# Patient Record
Sex: MALE | Race: WHITE | NOT HISPANIC OR LATINO | Employment: FULL TIME | URBAN - NONMETROPOLITAN AREA
[De-identification: names, ages, dates, MRNs, and addresses within clinical notes are randomized per-mention and may not be internally consistent; named-entity substitution may affect disease eponyms.]

---

## 2017-02-01 RX ORDER — OSELTAMIVIR PHOSPHATE 75 MG/1
75 CAPSULE ORAL 2 TIMES DAILY
Qty: 10 CAPSULE | Refills: 0 | Status: SHIPPED | OUTPATIENT
Start: 2017-02-01 | End: 2017-07-12

## 2017-02-09 ENCOUNTER — OFFICE VISIT (OUTPATIENT)
Dept: ORTHOPEDIC SURGERY | Facility: CLINIC | Age: 57
End: 2017-02-09

## 2017-02-09 VITALS — HEIGHT: 67 IN | BODY MASS INDEX: 32.65 KG/M2 | WEIGHT: 208 LBS

## 2017-02-09 DIAGNOSIS — S43.431A SLAP LESION OF RIGHT SHOULDER: ICD-10-CM

## 2017-02-09 DIAGNOSIS — M25.511 ACUTE PAIN OF RIGHT SHOULDER: Primary | ICD-10-CM

## 2017-02-09 DIAGNOSIS — M75.41 IMPINGEMENT SYNDROME OF RIGHT SHOULDER: ICD-10-CM

## 2017-02-09 DIAGNOSIS — I25.10 CAD IN NATIVE ARTERY: ICD-10-CM

## 2017-02-09 PROCEDURE — 20610 DRAIN/INJ JOINT/BURSA W/O US: CPT | Performed by: ORTHOPAEDIC SURGERY

## 2017-02-09 PROCEDURE — 99203 OFFICE O/P NEW LOW 30 MIN: CPT | Performed by: ORTHOPAEDIC SURGERY

## 2017-02-09 RX ADMIN — TRIAMCINOLONE ACETONIDE 40 MG: 40 INJECTION, SUSPENSION INTRA-ARTICULAR; INTRAMUSCULAR at 10:21

## 2017-02-09 RX ADMIN — LIDOCAINE HYDROCHLORIDE 2 ML: 10 INJECTION, SOLUTION INFILTRATION; PERINEURAL at 10:21

## 2017-02-09 NOTE — PROGRESS NOTES
Prudencio Rodriguez is a 56 y.o. male   Primary provider:  Amandeep Petersen MD       Chief Complaint   Patient presents with   • Right Shoulder - [facility]     Patient injuried his shoulder on 10/2016 while lifting a log to put a small piece under it to cut. Something popped in the shoulder and lost use for a small time do to pain       HISTORY OF PRESENT ILLNESS: RIGHT SHOULDER PAIN- HIS PAIN GETS WORSE AS THE DAY GOES ON AND HE IS ALSO HAVING PAIN ON THE RIGHT SIDE OF HIS NECK    Shoulder Injury    The incident occurred at home. The right shoulder is affected. Incident onset: 4 months ago. Injury mechanism: lifting a heavy object. Quality: sharp. The pain is moderate. Associated symptoms include numbness and tingling. The symptoms are aggravated by overhead lifting and movement. Treatments tried: steroid shot. The treatment provided mild relief.   Had an injection in early January without any changes.  No PT, No NSAIDS.  Worse pain in the evenings.  Overhead activity is worse.Occasionally wakes him up.  No numbness or tingling.     CONCURRENT MEDICAL HISTORY:    Past Medical History   Diagnosis Date   • Acute laryngitis    • Acute sinusitis    • Allergic rhinitis    • Asthma    • Backache    • Blood chemistry abnormality    • Chest pain    • Disorder      Disorder of cervical spine      • Disorder      Disorder of lumbar disc      • Dyspnea    • Encounter for general health examination      Individual health examination      • Encounter for screening for malignant neoplasm of prostate    • Encounter for screening for malignant neoplasm of prostate    • Fatigue    • Heart disease    • Hyperglycemia    • Knee pain      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      • Malaise and fatigue    • Pain in lower limb    • Tachycardia    • Vitamin B12 deficiency (non anemic)        No Known Allergies      Current Outpatient Prescriptions:   •  albuterol (PROVENTIL HFA;VENTOLIN HFA) 108  (90 BASE) MCG/ACT inhaler, Inhale 2 puffs Every 6 (Six) Hours As Needed for wheezing., Disp: 1 inhaler, Rfl: 5  •  clopidogrel (PLAVIX) 75 MG tablet, Take 1 tablet by mouth daily., Disp: 30 tablet, Rfl: 5  •  gabapentin (NEURONTIN) 300 MG capsule, Take 1 capsule by mouth Daily., Disp: 30 capsule, Rfl: 5  •  mometasone-formoterol (DULERA 200) 200-5 MCG/ACT inhaler, Inhale 2 puffs 2 (Two) Times a Day., Disp: 13 g, Rfl: 5  •  montelukast (SINGULAIR) 10 MG tablet, TAKE 1 TABLET BY MOUTH DAILY, Disp: 30 tablet, Rfl: 5  •  oseltamivir (TAMIFLU) 75 MG capsule, Take 1 capsule by mouth 2 (Two) Times a Day., Disp: 10 capsule, Rfl: 0  •  pantoprazole (PROTONIX) 40 MG EC tablet, Take 1 tablet by mouth Daily., Disp: 30 tablet, Rfl: 2  •  pravastatin (PRAVACHOL) 20 MG tablet, TAKE 1 TABLET BY MOUTH DAILY AT BEDTIME, Disp: 30 tablet, Rfl: 6    Past Surgical History   Procedure Laterality Date   • Knee arthroscopy  11/05/1987     Anesth, knee arthroscopy (1)      • Injection of medication  08/08/2014     B12 (1)     • Cardiac catheterization  07/13/2016     Cardiac cath 97492 (1)      • Injection of medication  01/20/2015     Depo Medrol (Methylprednisone) 80mg (3)    • Injection of medication  10/19/2015     Dexamethasone (1)      • Coronary stent placement     • Appendectomy  1970   • Sinus surgery  1995       Family History   Problem Relation Age of Onset   • Hypertension Other    • Thyroid disease Other      Thyroid Disorder   • Cancer Mother    • Diabetes Mother    • Hypertension Mother    • Diabetes insipidus Mother    • Heart disease Father    • Alzheimer's disease Father         Social History     Social History   • Marital status:      Spouse name: N/A   • Number of children: N/A   • Years of education: N/A     Occupational History   • Not on file.     Social History Main Topics   • Smoking status: Never Smoker   • Smokeless tobacco: Never Used   • Alcohol use No   • Drug use: No   • Sexual activity: Not on file  "    Other Topics Concern   • Not on file     Social History Narrative        Review of Systems   Respiratory: Negative.    Cardiovascular: Negative.    Neurological: Positive for tingling and numbness.   All other systems reviewed and are negative.      PHYSICAL EXAMINATION:       Visit Vitals   • Ht 67\" (170.2 cm)   • Wt 208 lb (94.3 kg)   • BMI 32.58 kg/m2       Physical Exam   Constitutional: He is oriented to person, place, and time. He appears well-developed and well-nourished.   Neurological: He is alert and oriented to person, place, and time.   Psychiatric: He has a normal mood and affect. His behavior is normal. Judgment and thought content normal.       GAIT:     [x]  Normal  []  Antalgic    Assistive device: [x]  None  []  Walker     []  Crutches  []  Cane     []  Wheelchair  []  Stretcher    Right Shoulder Exam     Range of Motion   Active Abduction: 130 (with pain.  150 passively with pain)   Forward Flexion: 160   External Rotation: 70     Muscle Strength   Abduction: 4/5   Subscapularis: 4/5     Tests   Cross Arm: positive  Hawkin's test: positive  Impingement: positive    Other   Erythema: absent  Sensation: normal  Pulse: present    Comments:  Stable exam.      Left Shoulder Exam     Tenderness   The patient is experiencing no tenderness.         Range of Motion   The patient has normal left shoulder ROM.    Muscle Strength   The patient has normal left shoulder strength.    Tests   Cross Arm: negative  Hawkin's test: negative  Impingement: negative    Other   Sensation: normal  Pulse: present     Comments:  Stable exam.              Xr Shoulder 2+ View Right    Result Date: 2/9/2017  Narrative: 3 views of the right shoulder show acceptable position and alignment with no evidence of acute bony abnormality.  Moderate acromioclavicular joint arthritic change with subclavicular spurring.  Type II acromion is noted. No comparison views available. 02/09/17 at 5:16 PM by Stephane Prieto MD "         MRI RIGHT SHOULDER DONE AT [facility] IN [place]  IMPRESSION:  1. SUPERIOR GLENOID LABRAL TEAR. ABNORMAL INTRASUBSTANCE SIGNAL APPROACHING FLUID INTENSITY IN THE ANTERIOR SUPERIOR, SUPERIOR AND POSTERIOR SUPERIOR QUADRANTS.    2. NO ROTATOR CUFF TEAR.  SEVERAL FINDINGS CAN BE ASSOCIATED WITH ROTATOR CUFF IMPINGEMENT; MILD TO MODERATE SUPRASPINATUS TENDINOSIS.  MILD INFRASPINATUS AND SUBSCAPULARIS TENDINOSIS.  MILD AC JOINT ARTHROSIS. SUBCORTICAL CYSTS IN THE POSTEROLATERAL HUMERAL HEAD.    3. OBLONG FLUID SIGNAL STRUCTURE IN THE MEDIAL INFERIOR SUBSCAPULARIS MUSCLE BELLY MEASURING 4.0 X 2.0 X 1.3 CM.  DIFFERENTIAL DIAGNOSIS INCLUDES GANGLION CYST ORIGINATING FROM THE GLENOHUMERAL JOINT, VERSUS SENTINEL CYST      ASSESSMENT:    Diagnoses and all orders for this visit:    Acute pain of right shoulder  -     XR Shoulder 2+ View Right  -     Large Joint Arthrocentesis  -     Ambulatory Referral to Physical Therapy Evaluate and treat (impingement and SLAP tear, 2-4 visits for ROM/STR, HEP. may need shoulder scope.)    CAD in native artery    SLAP lesion of right shoulder    Impingement syndrome of right shoulder      Large Joint Arthrocentesis  Date/Time: 2/9/2017 10:21 AM  Consent given by: patient  Site marked: site marked  Timeout: Immediately prior to procedure a time out was called to verify the correct patient, procedure, equipment, support staff and site/side marked as required   Supporting Documentation  Indications: pain   Procedure Details  Location: shoulder - R subacromial bursa  Preparation: Patient was prepped and draped in the usual sterile fashion  Needle size: 22 G  Approach: posterior  Medications administered: 40 mg triamcinolone acetonide 40 MG/ML; 2 mL lidocaine 1 %  Patient tolerance: patient tolerated the procedure well with no immediate complications          PLAN    Stent placed in LAD 8/2016  Plan repeat injection today   ROM/STR  PT for exercises and HEP  -progress motion as tolerated.  Discussed possible surgery if not improving.    Return in about 6 weeks (around 3/23/2017).    Stephane Prieto MD

## 2017-02-12 RX ORDER — LIDOCAINE HYDROCHLORIDE 10 MG/ML
2 INJECTION, SOLUTION INFILTRATION; PERINEURAL
Status: COMPLETED | OUTPATIENT
Start: 2017-02-09 | End: 2017-02-09

## 2017-02-12 RX ORDER — TRIAMCINOLONE ACETONIDE 40 MG/ML
40 INJECTION, SUSPENSION INTRA-ARTICULAR; INTRAMUSCULAR
Status: COMPLETED | OUTPATIENT
Start: 2017-02-09 | End: 2017-02-09

## 2017-03-08 ENCOUNTER — OFFICE VISIT (OUTPATIENT)
Dept: ORTHOPEDIC SURGERY | Facility: CLINIC | Age: 57
End: 2017-03-08

## 2017-03-08 VITALS — HEIGHT: 67 IN | BODY MASS INDEX: 32.96 KG/M2 | WEIGHT: 210 LBS

## 2017-03-08 DIAGNOSIS — M75.41 IMPINGEMENT SYNDROME OF RIGHT SHOULDER: ICD-10-CM

## 2017-03-08 DIAGNOSIS — M25.511 ACUTE PAIN OF RIGHT SHOULDER: Primary | ICD-10-CM

## 2017-03-08 DIAGNOSIS — S43.431A SLAP LESION OF RIGHT SHOULDER: ICD-10-CM

## 2017-03-08 PROCEDURE — 99213 OFFICE O/P EST LOW 20 MIN: CPT | Performed by: ORTHOPAEDIC SURGERY

## 2017-03-08 NOTE — PROGRESS NOTES
Prudencio Rodriguez is a 56 y.o. male returns for     Chief Complaint   Patient presents with   • Right Shoulder - Follow-up       HISTORY OF PRESENT ILLNESS: steroid injection done on 2/9/2017, has helped. Physical therapy at [facility] patient completed 4 visits is now doing HEP on a daily basis.        CONCURRENT MEDICAL HISTORY:    Past Medical History   Diagnosis Date   • Acute laryngitis    • Acute sinusitis    • Allergic rhinitis    • Asthma    • Backache    • Blood chemistry abnormality    • Chest pain    • Disorder      Disorder of cervical spine      • Disorder      Disorder of lumbar disc      • Dyspnea    • Encounter for general health examination      Individual health examination      • Encounter for screening for malignant neoplasm of prostate    • Encounter for screening for malignant neoplasm of prostate    • Fatigue    • Heart disease    • Hyperglycemia    • Knee pain      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      • Malaise and fatigue    • Pain in lower limb    • Tachycardia    • Vitamin B12 deficiency (non anemic)        No Known Allergies      Current Outpatient Prescriptions:   •  albuterol (PROVENTIL HFA;VENTOLIN HFA) 108 (90 BASE) MCG/ACT inhaler, Inhale 2 puffs Every 6 (Six) Hours As Needed for wheezing., Disp: 1 inhaler, Rfl: 5  •  clopidogrel (PLAVIX) 75 MG tablet, Take 1 tablet by mouth daily., Disp: 30 tablet, Rfl: 5  •  gabapentin (NEURONTIN) 300 MG capsule, Take 1 capsule by mouth Daily., Disp: 30 capsule, Rfl: 5  •  mometasone-formoterol (DULERA 200) 200-5 MCG/ACT inhaler, Inhale 2 puffs 2 (Two) Times a Day., Disp: 13 g, Rfl: 5  •  montelukast (SINGULAIR) 10 MG tablet, TAKE 1 TABLET BY MOUTH DAILY, Disp: 30 tablet, Rfl: 5  •  pantoprazole (PROTONIX) 40 MG EC tablet, Take 1 tablet by mouth Daily., Disp: 30 tablet, Rfl: 2  •  pravastatin (PRAVACHOL) 20 MG tablet, TAKE 1 TABLET BY MOUTH DAILY AT BEDTIME, Disp: 30 tablet, Rfl: 6  •  oseltamivir  "(TAMIFLU) 75 MG capsule, Take 1 capsule by mouth 2 (Two) Times a Day., Disp: 10 capsule, Rfl: 0    Past Surgical History   Procedure Laterality Date   • Knee arthroscopy  11/05/1987     Anesth, knee arthroscopy (1)      • Injection of medication  08/08/2014     B12 (1)     • Cardiac catheterization  07/13/2016     Cardiac cath 70823 (1)      • Injection of medication  01/20/2015     Depo Medrol (Methylprednisone) 80mg (3)    • Injection of medication  10/19/2015     Dexamethasone (1)      • Coronary stent placement     • Appendectomy  1970   • Sinus surgery  1995       ROS  No fevers or chills.  No chest pain or shortness of air.  No GI or  disturbances.    PHYSICAL EXAMINATION:       Visit Vitals   • Ht 67\" (170.2 cm)   • Wt 210 lb (95.3 kg)   • BMI 32.89 kg/m2       Physical Exam   Constitutional: He is oriented to person, place, and time. He appears well-developed and well-nourished.   Neurological: He is alert and oriented to person, place, and time.   Psychiatric: He has a normal mood and affect. His behavior is normal. Judgment and thought content normal.       GAIT:     [x]  Normal  []  Antalgic    Assistive device: [x]  None  []  Walker     []  Crutches  []  Cane     []  Wheelchair  []  Stretcher    Right Shoulder Exam     Range of Motion   Active Abduction: 160 (with pain.  150 passively with pain)   Forward Flexion: 160   External Rotation: 70     Muscle Strength   Abduction: 4/5   Subscapularis: 4/5     Tests   Cross Arm: negative  Hawkin's test: positive  Impingement: negative    Other   Erythema: absent  Sensation: normal  Pulse: present    Comments:  Stable exam.              Xr Shoulder 2+ View Right    Result Date: 2/9/2017  Narrative: 3 views of the right shoulder show acceptable position and alignment with no evidence of acute bony abnormality.  Moderate acromioclavicular joint arthritic change with subclavicular spurring.  Type II acromion is noted. No comparison views available. 02/09/17 at " 5:16 PM by Stephane Prieto MD     MRI RIGHT SHOULDER DONE AT [facility] IN [place]  IMPRESSION:  1. SUPERIOR GLENOID LABRAL TEAR. ABNORMAL INTRASUBSTANCE SIGNAL APPROACHING FLUID INTENSITY IN THE ANTERIOR SUPERIOR, SUPERIOR AND POSTERIOR SUPERIOR QUADRANTS.     2. NO ROTATOR CUFF TEAR. SEVERAL FINDINGS CAN BE ASSOCIATED WITH ROTATOR CUFF IMPINGEMENT; MILD TO MODERATE SUPRASPINATUS TENDINOSIS. MILD INFRASPINATUS AND SUBSCAPULARIS TENDINOSIS. MILD AC JOINT ARTHROSIS. SUBCORTICAL CYSTS IN THE POSTEROLATERAL HUMERAL HEAD.     3. OBLONG FLUID SIGNAL STRUCTURE IN THE MEDIAL INFERIOR SUBSCAPULARIS MUSCLE BELLY MEASURING 4.0 X 2.0 X 1.3 CM. DIFFERENTIAL DIAGNOSIS INCLUDES GANGLION CYST ORIGINATING FROM THE GLENOHUMERAL JOINT, VERSUS SENTINEL CYST        ASSESSMENT:    Diagnoses and all orders for this visit:    Acute pain of right shoulder    SLAP lesion of right shoulder    Impingement syndrome of right shoulder          PLAN    Pain is improving.  Motion improved.  Continue HEP.  Activity as tolerated.  Discussed possible repeat injection if symptoms worsen again.    Return if symptoms worsen or fail to improve.    Stephane Prieto MD

## 2017-03-29 RX ORDER — CLOPIDOGREL BISULFATE 75 MG/1
TABLET ORAL
Qty: 30 TABLET | Refills: 5 | Status: SHIPPED | OUTPATIENT
Start: 2017-03-29 | End: 2017-09-19 | Stop reason: SDUPTHER

## 2017-04-13 ENCOUNTER — OFFICE VISIT (OUTPATIENT)
Dept: CARDIOLOGY | Facility: CLINIC | Age: 57
End: 2017-04-13

## 2017-04-13 VITALS
OXYGEN SATURATION: 95 % | HEIGHT: 67 IN | WEIGHT: 208 LBS | HEART RATE: 84 BPM | DIASTOLIC BLOOD PRESSURE: 68 MMHG | SYSTOLIC BLOOD PRESSURE: 110 MMHG | BODY MASS INDEX: 32.65 KG/M2

## 2017-04-13 DIAGNOSIS — E78.5 HYPERLIPIDEMIA, UNSPECIFIED HYPERLIPIDEMIA TYPE: ICD-10-CM

## 2017-04-13 DIAGNOSIS — I25.10 CORONARY ARTERY DISEASE INVOLVING NATIVE CORONARY ARTERY OF NATIVE HEART WITHOUT ANGINA PECTORIS: Primary | ICD-10-CM

## 2017-04-13 PROCEDURE — 93010 ELECTROCARDIOGRAM REPORT: CPT | Performed by: INTERNAL MEDICINE

## 2017-04-13 PROCEDURE — 99213 OFFICE O/P EST LOW 20 MIN: CPT | Performed by: INTERNAL MEDICINE

## 2017-04-13 RX ORDER — ASPIRIN 81 MG/1
81 TABLET, CHEWABLE ORAL DAILY
COMMUNITY
End: 2017-07-12

## 2017-04-13 NOTE — PROGRESS NOTES
Chief complaint : Coronary artery disease    History of Present Illness this is a very pleasant 56-year-old gentleman who comes today for follow-up visit.  Patient underwent successful PCI with a stent placement to the LAD August 16, 2016.  Patient is tolerating the guideline direct medical therapy.  He was currently switched to aspirin 81 mg which has helped significantly with his dyspepsia.  Patient stated that he is feeling much better after his stent placement.    Subjective      Review of Systems   Constitution: Negative. Negative for decreased appetite, diaphoresis, weakness, night sweats, weight gain and weight loss.   HENT: Negative for headaches, hearing loss, nosebleeds and sore throat.    Eyes: Negative.  Negative for blurred vision and photophobia.   Cardiovascular: Negative for chest pain, claudication, dyspnea on exertion, irregular heartbeat, leg swelling, palpitations, paroxysmal nocturnal dyspnea and syncope.   Respiratory: Negative for cough, hemoptysis, shortness of breath and wheezing.    Endocrine: Negative for cold intolerance, heat intolerance, polydipsia, polyphagia and polyuria.   Hematologic/Lymphatic: Negative.    Skin: Negative for color change, dry skin, flushing, itching and rash.   Musculoskeletal: Negative.  Negative for muscle cramps, muscle weakness and myalgias.   Gastrointestinal: Negative for abdominal pain, change in bowel habit, diarrhea, hematemesis, melena, nausea and vomiting.   Genitourinary: Negative for dysuria, frequency and hematuria.   Neurological: Negative for dizziness, focal weakness, light-headedness, loss of balance, numbness and seizures.   Psychiatric/Behavioral: Negative.  Negative for substance abuse, suicidal ideas and thoughts of violence.   Allergic/Immunologic: Negative.        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     reports that he has never smoked. He has never used smokeless tobacco. He reports that he does not drink alcohol or use illicit drugs.    Objective     Vital Signs  Heart Rate:  [84] 84  BP: (110)/(68) 110/68    Physical Exam   Constitutional: He is oriented to person, place, and time. He appears well-developed and well-nourished.   HENT:   Head: Normocephalic and atraumatic.   Eyes: Conjunctivae and EOM are normal. Pupils are equal, round, and reactive to light.   Neck: Neck supple. No JVD present. Carotid bruit is not present. No tracheal deviation and no edema present.   Cardiovascular: Normal rate, regular rhythm, S1 normal, S2 normal, normal heart sounds and intact distal pulses.  Exam reveals no gallop, no S3, no S4 and no friction rub.    No murmur heard.  Pulmonary/Chest: Effort normal and breath sounds normal. He has no wheezes. He has no rales. He exhibits no tenderness.   Abdominal: Bowel sounds are normal. He exhibits no abdominal bruit and no pulsatile midline mass. There is no rebound and no guarding.    Musculoskeletal: Normal range of motion. He exhibits no edema.   Neurological: He is alert and oriented to person, place, and time.   Skin: Skin is warm and dry.   Psychiatric: He has a normal mood and affect.       SCANNED EKG  Date/Time: 4/13/2017 5:40 PM  Performed by: FRANCO CONCEPCION  Authorized by: DERRICK GARG       ECG 12 Lead  Date/Time: 4/13/2017 5:41 PM  Performed by: FRANCO CONCEPCION  Authorized by: FRANCO CONCEPCION   Comparison: compared with previous ECG from 8/15/2016  Similar to previous ECG  Rhythm: sinus rhythm  Rate: normal  BPM: 78  Conduction: conduction normal  ST Segments: ST segments normal  T Waves: T waves normal  QRS axis: normal  Other: no other findings  Clinical impression: normal ECG            Assessment/Plan     Patient Active Problem List   Diagnosis   • Hyperlipidemia   • Coronary artery disease involving native coronary artery of native heart without angina pectoris   • Hyperglycemia   • Mild persistent asthma without complication   • Acute pain of right shoulder   • SLAP lesion of right shoulder   • Impingement syndrome of right shoulder     1. Coronary artery disease involving native coronary artery of native heart without angina pectoris  August 16, 2016 patient underwent diagnostic coronary angiogram followed by PCI.  3.0 x 18 mm Xience Alpine stent was deployed to his LAD.  Plan:   · We will continue with dual antiplatelet treatment.  · Continue with guideline direct medical therapy  · Continue with risk factor modification  · Patient can stop taking Plavix for his shoulder surgery in August of this year.  We will put him back on Plavix after his surgery.  ·   2. Hyperlipidemia, unspecified hyperlipidemia type  Patient currently is tolerating statin very well.  He has no myalgias or arthralgias.  Component      Latest Ref Rng & Units 8/7/2014 10/20/2015 12/16/2016   LDL Cholesterol      0.0 - 129.0 mg/dl 108 140 79.0   Plan:  · Patient will need a fasting lipid  profile.  · Continue with current dose of statin.    I discussed the patients findings and my recommendations with patient.    Joy Henry MD  04/13/17  5:38 PM    EMR Dragon/Transcription disclaimer:   Much of this encounter note is an electronic transcription/translation of spoken language to printed text. The electronic translation of spoken language may permit erroneous, or at times, nonsensical words or phrases to be inadvertently transcribed; Although I have reviewed the note for such errors, some may still exist.

## 2017-06-06 RX ORDER — PRAVASTATIN SODIUM 20 MG
TABLET ORAL
Qty: 30 TABLET | Refills: 7 | Status: SHIPPED | OUTPATIENT
Start: 2017-06-06 | End: 2017-09-19 | Stop reason: SDUPTHER

## 2017-06-20 ENCOUNTER — LAB (OUTPATIENT)
Dept: LAB | Facility: OTHER | Age: 57
End: 2017-06-20

## 2017-06-20 DIAGNOSIS — R73.9 HYPERGLYCEMIA: ICD-10-CM

## 2017-06-20 DIAGNOSIS — E78.5 HYPERLIPIDEMIA, UNSPECIFIED HYPERLIPIDEMIA TYPE: ICD-10-CM

## 2017-06-20 DIAGNOSIS — I25.10 CORONARY ARTERY DISEASE INVOLVING NATIVE CORONARY ARTERY OF NATIVE HEART WITHOUT ANGINA PECTORIS: ICD-10-CM

## 2017-06-20 LAB
ALBUMIN SERPL-MCNC: 4.2 G/DL (ref 3.2–5.5)
ALBUMIN/GLOB SERPL: 1.3 G/DL (ref 1–3)
ALP SERPL-CCNC: 113 U/L (ref 15–121)
ALT SERPL W P-5'-P-CCNC: 23 U/L (ref 10–60)
ANION GAP SERPL CALCULATED.3IONS-SCNC: 11 MMOL/L (ref 5–15)
AST SERPL-CCNC: 27 U/L (ref 10–60)
BASOPHILS # BLD AUTO: 0.03 10*3/MM3 (ref 0–0.2)
BASOPHILS NFR BLD AUTO: 0.4 % (ref 0–2)
BILIRUB SERPL-MCNC: 0.6 MG/DL (ref 0.2–1)
BUN BLD-MCNC: 21 MG/DL (ref 8–25)
BUN/CREAT SERPL: 17.5 (ref 7–25)
CALCIUM SPEC-SCNC: 9.3 MG/DL (ref 8.4–10.8)
CHLORIDE SERPL-SCNC: 103 MMOL/L (ref 100–112)
CHOLEST SERPL-MCNC: 157 MG/DL (ref 150–200)
CO2 SERPL-SCNC: 26 MMOL/L (ref 20–32)
CREAT BLD-MCNC: 1.2 MG/DL (ref 0.4–1.3)
DEPRECATED RDW RBC AUTO: 43.7 FL (ref 35.1–43.9)
EOSINOPHIL # BLD AUTO: 0.37 10*3/MM3 (ref 0–0.7)
EOSINOPHIL NFR BLD AUTO: 4.6 % (ref 0–7)
ERYTHROCYTE [DISTWIDTH] IN BLOOD BY AUTOMATED COUNT: 13.4 % (ref 11.5–14.5)
GFR SERPL CREATININE-BSD FRML MDRD: 62 ML/MIN/1.73 (ref 56–130)
GLOBULIN UR ELPH-MCNC: 3.2 GM/DL (ref 2.5–4.6)
GLUCOSE BLD-MCNC: 113 MG/DL (ref 70–100)
HBA1C MFR BLD: 5.73 % (ref 4–5.6)
HCT VFR BLD AUTO: 46 % (ref 39–49)
HDLC SERPL-MCNC: 40 MG/DL (ref 35–100)
HGB BLD-MCNC: 15.3 G/DL (ref 13.7–17.3)
LDLC SERPL CALC-MCNC: 84 MG/DL
LDLC/HDLC SERPL: 2.1 {RATIO}
LYMPHOCYTES # BLD AUTO: 1.67 10*3/MM3 (ref 0.6–4.2)
LYMPHOCYTES NFR BLD AUTO: 20.8 % (ref 10–50)
MCH RBC QN AUTO: 29.9 PG (ref 26.5–34)
MCHC RBC AUTO-ENTMCNC: 33.3 G/DL (ref 31.5–36.3)
MCV RBC AUTO: 90 FL (ref 80–98)
MONOCYTES # BLD AUTO: 0.89 10*3/MM3 (ref 0–0.9)
MONOCYTES NFR BLD AUTO: 11.1 % (ref 0–12)
NEUTROPHILS # BLD AUTO: 5.06 10*3/MM3 (ref 2–8.6)
NEUTROPHILS NFR BLD AUTO: 63.1 % (ref 37–80)
PLATELET # BLD AUTO: 329 10*3/MM3 (ref 150–450)
PMV BLD AUTO: 9.8 FL (ref 8–12)
POTASSIUM BLD-SCNC: 4.3 MMOL/L (ref 3.4–5.4)
PROT SERPL-MCNC: 7.4 G/DL (ref 6.7–8.2)
PSA SERPL-MCNC: 0.92 NG/ML (ref 0–4)
RBC # BLD AUTO: 5.11 10*6/MM3 (ref 4.37–5.74)
SODIUM BLD-SCNC: 140 MMOL/L (ref 134–146)
TRIGL SERPL-MCNC: 165 MG/DL (ref 35–160)
TSH SERPL DL<=0.05 MIU/L-ACNC: 5.25 MIU/ML (ref 0.46–4.68)
VLDLC SERPL-MCNC: 33 MG/DL
WBC NRBC COR # BLD: 8.02 10*3/MM3 (ref 3.2–9.8)

## 2017-06-20 PROCEDURE — 80053 COMPREHEN METABOLIC PANEL: CPT | Performed by: FAMILY MEDICINE

## 2017-06-20 PROCEDURE — 83036 HEMOGLOBIN GLYCOSYLATED A1C: CPT | Performed by: FAMILY MEDICINE

## 2017-06-20 PROCEDURE — 80061 LIPID PANEL: CPT | Performed by: FAMILY MEDICINE

## 2017-06-20 PROCEDURE — 84443 ASSAY THYROID STIM HORMONE: CPT | Performed by: FAMILY MEDICINE

## 2017-06-20 PROCEDURE — 85025 COMPLETE CBC W/AUTO DIFF WBC: CPT | Performed by: FAMILY MEDICINE

## 2017-06-20 PROCEDURE — 36415 COLL VENOUS BLD VENIPUNCTURE: CPT | Performed by: FAMILY MEDICINE

## 2017-06-20 PROCEDURE — G0103 PSA SCREENING: HCPCS | Performed by: FAMILY MEDICINE

## 2017-07-12 ENCOUNTER — OFFICE VISIT (OUTPATIENT)
Dept: FAMILY MEDICINE CLINIC | Facility: CLINIC | Age: 57
End: 2017-07-12

## 2017-07-12 VITALS
BODY MASS INDEX: 33.09 KG/M2 | TEMPERATURE: 98.2 F | SYSTOLIC BLOOD PRESSURE: 130 MMHG | HEART RATE: 97 BPM | RESPIRATION RATE: 16 BRPM | WEIGHT: 210.8 LBS | DIASTOLIC BLOOD PRESSURE: 78 MMHG | HEIGHT: 67 IN | OXYGEN SATURATION: 94 %

## 2017-07-12 DIAGNOSIS — J45.20 MILD INTERMITTENT ASTHMA WITHOUT COMPLICATION: ICD-10-CM

## 2017-07-12 DIAGNOSIS — G47.33 OBSTRUCTIVE SLEEP APNEA SYNDROME: ICD-10-CM

## 2017-07-12 DIAGNOSIS — R73.9 HYPERGLYCEMIA: ICD-10-CM

## 2017-07-12 DIAGNOSIS — E78.01 FAMILIAL HYPERCHOLESTEROLEMIA: ICD-10-CM

## 2017-07-12 DIAGNOSIS — I25.10 CORONARY ARTERY DISEASE INVOLVING NATIVE CORONARY ARTERY OF NATIVE HEART WITHOUT ANGINA PECTORIS: ICD-10-CM

## 2017-07-12 DIAGNOSIS — E03.9 ACQUIRED HYPOTHYROIDISM: Primary | ICD-10-CM

## 2017-07-12 PROCEDURE — 99214 OFFICE O/P EST MOD 30 MIN: CPT | Performed by: FAMILY MEDICINE

## 2017-07-12 RX ORDER — LEVOTHYROXINE SODIUM 0.07 MG/1
75 TABLET ORAL DAILY
Qty: 30 TABLET | Refills: 3 | Status: SHIPPED | OUTPATIENT
Start: 2017-07-12 | End: 2017-09-13 | Stop reason: SDUPTHER

## 2017-07-12 NOTE — PROGRESS NOTES
Subjective   Prudencio Rodriguez is a 57 y.o. male who presents to the office for follow-up and review of labs.     History of Present Illness   Patient with multiple medical problems including hyperlipidemia, hyperglycemia, and CAD, and asthma is in today for reevaluation and review labs.  Patient is complaining of sneezing fatigue.  States that it is difficult to make it for a full day.  He has had some mild chest pain that does not feel like his previous angina.  He has been sleeping more.  He has been compliant with his CPAP.  Denies any neurological symptoms.    The following portions of the patient's history were reviewed and updated as appropriate: allergies, current medications, past family history, past medical history, past social history, past surgical history and problem list.    Review of Systems   Constitutional: Positive for fatigue.   HENT: Negative.    Eyes: Negative.    Respiratory: Negative.    Cardiovascular: Negative.    Gastrointestinal: Negative.    Endocrine: Negative.    Genitourinary: Negative.    Musculoskeletal: Positive for arthralgias and neck pain.   Skin: Negative.    Allergic/Immunologic: Negative.    Neurological: Negative.    Hematological: Negative.    Psychiatric/Behavioral: Positive for sleep disturbance.   All other systems reviewed and are negative.      Objective   Physical Exam   Constitutional: He is oriented to person, place, and time. He appears well-developed and well-nourished.   HENT:   Head: Normocephalic and atraumatic.   Right Ear: External ear normal.   Left Ear: External ear normal.   Nose: Nose normal.   Mouth/Throat: Oropharynx is clear and moist.   Eyes: Conjunctivae and EOM are normal. Pupils are equal, round, and reactive to light.   Neck: Normal range of motion. Neck supple.   Cardiovascular: Normal rate, regular rhythm, normal heart sounds and intact distal pulses.  Exam reveals no gallop and no friction rub.    No murmur heard.  Pulmonary/Chest: Effort  normal and breath sounds normal. He has no wheezes. He has no rales.   Abdominal: Soft. Bowel sounds are normal. He exhibits no mass. There is no tenderness. There is no rebound and no guarding.   Musculoskeletal:        Right shoulder: He exhibits decreased range of motion, tenderness and crepitus.   Neurological: He is alert and oriented to person, place, and time. He has normal reflexes. No cranial nerve deficit. He exhibits normal muscle tone.   Skin: Skin is warm and dry. No rash noted.   Psychiatric: He has a normal mood and affect. His behavior is normal. Judgment and thought content normal.   Nursing note and vitals reviewed.      Assessment/Plan   Prudencio was seen today for hyperglycemia.    Diagnoses and all orders for this visit:    Acquired hypothyroidism  -     TSH; Future  -     T4, Free; Future    Coronary artery disease involving native coronary artery of native heart without angina pectoris    Familial hypercholesterolemia    Mild intermittent asthma without complication    Hyperglycemia    Obstructive sleep apnea syndrome    Other orders  -     levothyroxine (SYNTHROID) 75 MCG tablet; Take 1 tablet by mouth Daily.    Continue other therapies     Labs are reviewed with patient.    PHQ-9 Depression Screening 7/12/2017   Little interest or pleasure in doing things 2   Feeling down, depressed, or hopeless 2   Trouble falling or staying asleep, or sleeping too much 3   Feeling tired or having little energy 3   Poor appetite or overeating 2   Feeling bad about yourself - or that you are a failure or have let yourself or your family down 0   Trouble concentrating on things, such as reading the newspaper or watching television 0   Moving or speaking so slowly that other people could have noticed. Or the opposite - being so fidgety or restless that you have been moving around a lot more than usual 0   Thoughts that you would be better off dead, or of hurting yourself in some way 0   PHQ-9 Total Score 12    If you checked off any problems, how difficult have these problems made it for you to do your work, take care of things at home, or get along with other people? Somewhat difficult         Lab on 06/20/2017   Component Date Value Ref Range Status   • Glucose 06/20/2017 113* 70 - 100 mg/dL Final   • BUN 06/20/2017 21  8 - 25 mg/dL Final   • Creatinine 06/20/2017 1.20  0.40 - 1.30 mg/dL Final   • Sodium 06/20/2017 140  134 - 146 mmol/L Final   • Potassium 06/20/2017 4.3  3.4 - 5.4 mmol/L Final   • Chloride 06/20/2017 103  100 - 112 mmol/L Final   • CO2 06/20/2017 26.0  20.0 - 32.0 mmol/L Final   • Calcium 06/20/2017 9.3  8.4 - 10.8 mg/dL Final   • Total Protein 06/20/2017 7.4  6.7 - 8.2 g/dL Final   • Albumin 06/20/2017 4.20  3.20 - 5.50 g/dL Final   • ALT (SGPT) 06/20/2017 23  10 - 60 U/L Final   • AST (SGOT) 06/20/2017 27  10 - 60 U/L Final   • Alkaline Phosphatase 06/20/2017 113  15 - 121 U/L Final   • Total Bilirubin 06/20/2017 0.6  0.2 - 1.0 mg/dL Final   • eGFR Non  Amer 06/20/2017 62  56 - 130 mL/min/1.73 Final   • Globulin 06/20/2017 3.2  2.5 - 4.6 gm/dL Final   • A/G Ratio 06/20/2017 1.3  1.0 - 3.0 g/dL Final   • BUN/Creatinine Ratio 06/20/2017 17.5  7.0 - 25.0 Final   • Anion Gap 06/20/2017 11.0  5.0 - 15.0 mmol/L Final   • Hemoglobin A1C 06/20/2017 5.73* 4 - 5.6 % Final   • Total Cholesterol 06/20/2017 157  150 - 200 mg/dL Final   • Triglycerides 06/20/2017 165* 35 - 160 mg/dL Final   • HDL Cholesterol 06/20/2017 40  35 - 100 mg/dL Final   • LDL Cholesterol  06/20/2017 84  mg/dL Final   • VLDL Cholesterol 06/20/2017 33  mg/dL Final   • LDL/HDL Ratio 06/20/2017 2.10   Final   • TSH 06/20/2017 5.250* 0.460 - 4.680 mIU/mL Final   • PSA 06/20/2017 0.922  0.000 - 4.000 ng/mL Final   • WBC 06/20/2017 8.02  3.20 - 9.80 10*3/mm3 Final   • RBC 06/20/2017 5.11  4.37 - 5.74 10*6/mm3 Final   • Hemoglobin 06/20/2017 15.3  13.7 - 17.3 g/dL Final   • Hematocrit 06/20/2017 46.0  39.0 - 49.0 % Final   • MCV  06/20/2017 90.0  80.0 - 98.0 fL Final   • MCH 06/20/2017 29.9  26.5 - 34.0 pg Final   • MCHC 06/20/2017 33.3  31.5 - 36.3 g/dL Final   • RDW 06/20/2017 13.4  11.5 - 14.5 % Final   • RDW-SD 06/20/2017 43.7  35.1 - 43.9 fl Final   • MPV 06/20/2017 9.8  8.0 - 12.0 fL Final   • Platelets 06/20/2017 329  150 - 450 10*3/mm3 Final   • Neutrophil % 06/20/2017 63.1  37.0 - 80.0 % Final   • Lymphocyte % 06/20/2017 20.8  10.0 - 50.0 % Final   • Monocyte % 06/20/2017 11.1  0.0 - 12.0 % Final   • Eosinophil % 06/20/2017 4.6  0.0 - 7.0 % Final   • Basophil % 06/20/2017 0.4  0.0 - 2.0 % Final   • Neutrophils, Absolute 06/20/2017 5.06  2.00 - 8.60 10*3/mm3 Final   • Lymphocytes, Absolute 06/20/2017 1.67  0.60 - 4.20 10*3/mm3 Final   • Monocytes, Absolute 06/20/2017 0.89  0.00 - 0.90 10*3/mm3 Final   • Eosinophils, Absolute 06/20/2017 0.37  0.00 - 0.70 10*3/mm3 Final   • Basophils, Absolute 06/20/2017 0.03  0.00 - 0.20 10*3/mm3 Final   ]

## 2017-08-08 ENCOUNTER — OFFICE VISIT (OUTPATIENT)
Dept: ORTHOPEDIC SURGERY | Facility: CLINIC | Age: 57
End: 2017-08-08

## 2017-08-08 VITALS — HEIGHT: 67 IN | BODY MASS INDEX: 32.96 KG/M2 | WEIGHT: 210 LBS

## 2017-08-08 DIAGNOSIS — M25.511 ACUTE PAIN OF RIGHT SHOULDER: Primary | ICD-10-CM

## 2017-08-08 DIAGNOSIS — M75.41 IMPINGEMENT SYNDROME OF RIGHT SHOULDER: ICD-10-CM

## 2017-08-08 DIAGNOSIS — M25.521 RIGHT ELBOW PAIN: ICD-10-CM

## 2017-08-08 DIAGNOSIS — S43.431A SLAP LESION OF RIGHT SHOULDER: ICD-10-CM

## 2017-08-08 PROCEDURE — 20605 DRAIN/INJ JOINT/BURSA W/O US: CPT | Performed by: ORTHOPAEDIC SURGERY

## 2017-08-08 PROCEDURE — 99213 OFFICE O/P EST LOW 20 MIN: CPT | Performed by: ORTHOPAEDIC SURGERY

## 2017-08-08 RX ORDER — MELOXICAM 15 MG/1
15 TABLET ORAL DAILY
Qty: 30 TABLET | Refills: 2 | Status: SHIPPED | OUTPATIENT
Start: 2017-08-08 | End: 2017-09-19

## 2017-08-08 RX ADMIN — TRIAMCINOLONE ACETONIDE 20 MG: 40 INJECTION, SUSPENSION INTRA-ARTICULAR; INTRAMUSCULAR at 16:13

## 2017-08-08 NOTE — PROGRESS NOTES
Prudencio Rodriguez is a 57 y.o. male returns for     Chief Complaint   Patient presents with   • Right Shoulder - Follow-up       HISTORY OF PRESENT ILLNESS:   Patient returns with right shoulder pain. His last injection was given 2/9/17.       CONCURRENT MEDICAL HISTORY:    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      • Malaise and fatigue    • Pain in lower limb    • Tachycardia    • Vitamin B12 deficiency (non anemic)        No Known Allergies      Current Outpatient Prescriptions:   •  albuterol (PROVENTIL HFA;VENTOLIN HFA) 108 (90 BASE) MCG/ACT inhaler, Inhale 2 puffs Every 6 (Six) Hours As Needed for wheezing., Disp: 1 inhaler, Rfl: 5  •  clopidogrel (PLAVIX) 75 MG tablet, TAKE 1 TABLET BY MOUTH DAILY., Disp: 30 tablet, Rfl: 5  •  levothyroxine (SYNTHROID) 75 MCG tablet, Take 1 tablet by mouth Daily., Disp: 30 tablet, Rfl: 3  •  meloxicam (MOBIC) 15 MG tablet, Take 1 tablet by mouth Daily., Disp: 30 tablet, Rfl: 2  •  mometasone-formoterol (DULERA 200) 200-5 MCG/ACT inhaler, Inhale 2 puffs 2 (Two) Times a Day., Disp: 13 g, Rfl: 5  •  montelukast (SINGULAIR) 10 MG tablet, TAKE 1 TABLET BY MOUTH DAILY, Disp: 30 tablet, Rfl: 5  •  pravastatin (PRAVACHOL) 20 MG tablet, TAKE 1 TABLET BY MOUTH DAILY AT BEDTIME, Disp: 30 tablet, Rfl: 7    Past Surgical History:   Procedure Laterality Date   • APPENDECTOMY  1970   • CARDIAC  "CATHETERIZATION  07/13/2016    Cardiac cath 28119 (1)      • CORONARY STENT PLACEMENT     • INJECTION OF MEDICATION  08/08/2014    B12 (1)     • INJECTION OF MEDICATION  01/20/2015    Depo Medrol (Methylprednisone) 80mg (3)    • INJECTION OF MEDICATION  10/19/2015    Dexamethasone (1)      • KNEE ARTHROSCOPY  11/05/1987    Anesth, knee arthroscopy (1)      • SINUS SURGERY  1995       ROS  No fevers or chills.  No chest pain or shortness of air.  No GI or  disturbances.    PHYSICAL EXAMINATION:       Ht 66.75\" (169.5 cm)  Wt 210 lb (95.3 kg)  BMI 33.14 kg/m2    Physical Exam   Constitutional: He is oriented to person, place, and time. He appears well-developed and well-nourished.   Neurological: He is alert and oriented to person, place, and time.   Psychiatric: He has a normal mood and affect. His behavior is normal. Judgment and thought content normal.         Right Shoulder Exam     Range of Motion   Active Abduction: 160 (with pain.  )   Forward Flexion: 160   External Rotation: 70     Muscle Strength   Abduction: 4/5   Subscapularis: 4/5     Tests   Cross Arm: negative  Hawkin's test: positive  Impingement: negative    Other   Erythema: absent  Sensation: normal  Pulse: present    Comments:  Stable exam.                Xr Shoulder 2+ View Right     Result Date: 2/9/2017  Narrative: 3 views of the right shoulder show acceptable position and alignment with no evidence of acute bony abnormality.  Moderate acromioclavicular joint arthritic change with subclavicular spurring.  Type II acromion is noted. No comparison views available. 02/09/17 at 5:16 PM by Stephane Prieto MD      MRI RIGHT SHOULDER DONE AT [facility] IN [place]  IMPRESSION:  1. SUPERIOR GLENOID LABRAL TEAR. ABNORMAL INTRASUBSTANCE SIGNAL APPROACHING FLUID INTENSITY IN THE ANTERIOR SUPERIOR, SUPERIOR AND POSTERIOR SUPERIOR QUADRANTS.      2. NO ROTATOR CUFF TEAR. SEVERAL FINDINGS CAN BE ASSOCIATED WITH ROTATOR CUFF " IMPINGEMENT; MILD TO MODERATE SUPRASPINATUS TENDINOSIS. MILD INFRASPINATUS AND SUBSCAPULARIS TENDINOSIS. MILD AC JOINT ARTHROSIS. SUBCORTICAL CYSTS IN THE POSTEROLATERAL HUMERAL HEAD.      3. OBLONG FLUID SIGNAL STRUCTURE IN THE MEDIAL INFERIOR SUBSCAPULARIS MUSCLE BELLY MEASURING 4.0 X 2.0 X 1.3 CM. DIFFERENTIAL DIAGNOSIS INCLUDES GANGLION CYST ORIGINATING FROM THE GLENOHUMERAL JOINT, VERSUS SENTINEL CYST           ASSESSMENT:    Diagnoses and all orders for this visit:    Acute pain of right shoulder    SLAP lesion of right shoulder    Impingement syndrome of right shoulder    Right elbow pain  -     Medium Joint Arthrocentesis    Other orders  -     meloxicam (MOBIC) 15 MG tablet; Take 1 tablet by mouth Daily.    Medium Joint Arthrocentesis  Date/Time: 8/8/2017 4:13 PM  Consent given by: patient  Site marked: site marked  Timeout: Immediately prior to procedure a time out was called to verify the correct patient, procedure, equipment, support staff and site/side marked as required   Supporting Documentation  Indications: pain   Procedure Details  Location: elbow - R elbow  Preparation: Patient was prepped and draped in the usual sterile fashion  Needle size: 22 G  Approach: anterolateral  Medications administered: 20 mg triamcinolone acetonide 40 MG/ML  Patient tolerance: patient tolerated the procedure well with no immediate complications              PLAN    In general he has done well with the injection.  We'll continue to monitor as time progresses.  Continue range of motion and strengthening slowly as pain allows.  Follow-up in 3 months for repeat evaluation.    Return in about 3 months (around 11/8/2017) for recheck.    Stephane Prieto MD

## 2017-08-11 RX ORDER — TRIAMCINOLONE ACETONIDE 40 MG/ML
20 INJECTION, SUSPENSION INTRA-ARTICULAR; INTRAMUSCULAR
Status: COMPLETED | OUTPATIENT
Start: 2017-08-08 | End: 2017-08-08

## 2017-09-05 ENCOUNTER — LAB (OUTPATIENT)
Dept: LAB | Facility: OTHER | Age: 57
End: 2017-09-05

## 2017-09-05 DIAGNOSIS — E03.9 ACQUIRED HYPOTHYROIDISM: ICD-10-CM

## 2017-09-05 LAB
T4 FREE SERPL-MCNC: 1.43 NG/DL (ref 0.78–2.19)
TSH SERPL DL<=0.05 MIU/L-ACNC: 2.1 MIU/ML (ref 0.46–4.68)

## 2017-09-05 PROCEDURE — 36415 COLL VENOUS BLD VENIPUNCTURE: CPT | Performed by: FAMILY MEDICINE

## 2017-09-05 PROCEDURE — 84443 ASSAY THYROID STIM HORMONE: CPT | Performed by: FAMILY MEDICINE

## 2017-09-05 PROCEDURE — 84439 ASSAY OF FREE THYROXINE: CPT | Performed by: FAMILY MEDICINE

## 2017-09-13 ENCOUNTER — OFFICE VISIT (OUTPATIENT)
Dept: FAMILY MEDICINE CLINIC | Facility: CLINIC | Age: 57
End: 2017-09-13

## 2017-09-13 VITALS
DIASTOLIC BLOOD PRESSURE: 88 MMHG | WEIGHT: 208.2 LBS | BODY MASS INDEX: 32.68 KG/M2 | HEART RATE: 71 BPM | SYSTOLIC BLOOD PRESSURE: 136 MMHG | TEMPERATURE: 97.4 F | OXYGEN SATURATION: 97 % | RESPIRATION RATE: 16 BRPM | HEIGHT: 67 IN

## 2017-09-13 DIAGNOSIS — E03.9 ACQUIRED HYPOTHYROIDISM: Primary | ICD-10-CM

## 2017-09-13 DIAGNOSIS — I25.10 CORONARY ARTERY DISEASE INVOLVING NATIVE CORONARY ARTERY OF NATIVE HEART WITHOUT ANGINA PECTORIS: ICD-10-CM

## 2017-09-13 DIAGNOSIS — R73.9 HYPERGLYCEMIA: ICD-10-CM

## 2017-09-13 DIAGNOSIS — E78.01 FAMILIAL HYPERCHOLESTEROLEMIA: ICD-10-CM

## 2017-09-13 PROCEDURE — 99214 OFFICE O/P EST MOD 30 MIN: CPT | Performed by: FAMILY MEDICINE

## 2017-09-13 RX ORDER — LEVOTHYROXINE SODIUM 0.07 MG/1
75 TABLET ORAL DAILY
Qty: 30 TABLET | Refills: 3 | Status: SHIPPED | OUTPATIENT
Start: 2017-09-13 | End: 2019-08-14

## 2017-09-13 NOTE — PROGRESS NOTES
Subjective   Prudencio Rodriguez is a 57 y.o. male who presents to the office for follow-up and review of labs.     History of Present Illness   Patient with known history of hypothyroidism, hypertension, and CAD is in today for reevaluation and recheck his thyroid tests.  Patient has been on the medication for 2 months but still complaining of weakness and fatigue.  He has also had some episodes of chest is comfortable with radiation to the left shoulder.  This is similar to when he had his blockages previously.    The following portions of the patient's history were reviewed and updated as appropriate: allergies, current medications, past family history, past medical history, past social history, past surgical history and problem list.    Review of Systems   Constitutional: Positive for fatigue.   HENT: Negative.    Eyes: Negative.    Respiratory: Positive for shortness of breath.    Cardiovascular: Positive for chest pain.   Gastrointestinal: Negative.    Endocrine: Negative.    Genitourinary: Negative.    Musculoskeletal: Negative.    Skin: Negative.    Allergic/Immunologic: Negative.    Neurological: Negative.    Hematological: Negative.    Psychiatric/Behavioral: Negative.    All other systems reviewed and are negative.      Objective   Physical Exam   Constitutional: He is oriented to person, place, and time. He appears well-developed and well-nourished.   HENT:   Head: Normocephalic and atraumatic.   Right Ear: External ear normal.   Left Ear: External ear normal.   Nose: Nose normal.   Mouth/Throat: Oropharynx is clear and moist.   Eyes: Conjunctivae and EOM are normal. Pupils are equal, round, and reactive to light.   Neck: Normal range of motion. Neck supple.   Cardiovascular: Normal rate, regular rhythm, normal heart sounds and intact distal pulses.  Exam reveals no gallop and no friction rub.    No murmur heard.  Pulmonary/Chest: Effort normal and breath sounds normal. He has no wheezes. He has no  rales.   Abdominal: Soft. Bowel sounds are normal. He exhibits no mass. There is no tenderness. There is no rebound and no guarding.   Musculoskeletal: Normal range of motion.   Neurological: He is alert and oriented to person, place, and time. He has normal reflexes. No cranial nerve deficit. He exhibits normal muscle tone.   Skin: Skin is warm and dry. No rash noted.   Psychiatric: He has a normal mood and affect. His behavior is normal. Judgment and thought content normal.   Nursing note and vitals reviewed.      Assessment/Plan   Prudencio was seen today for hyperglycemia.    Diagnoses and all orders for this visit:    Acquired hypothyroidism  -     TSH; Future  -     T4, Free; Future    Hyperglycemia  -     Comprehensive Metabolic Panel; Future  -     Hemoglobin A1c; Future  -     LDL Cholesterol, Direct; Future    Familial hypercholesterolemia    Coronary artery disease involving native coronary artery of native heart without angina pectoris    Other orders  -     levothyroxine (SYNTHROID) 75 MCG tablet; Take 1 tablet by mouth Daily.    Referral back to cardiology.     Labs are reviewed with patient.    PHQ-2/PHQ-9 Depression Screening 7/12/2017   Little interest or pleasure in doing things 2   Feeling down, depressed, or hopeless 2   Trouble falling or staying asleep, or sleeping too much 3   Feeling tired or having little energy 3   Poor appetite or overeating 2   Feeling bad about yourself - or that you are a failure or have let yourself or your family down 0   Trouble concentrating on things, such as reading the newspaper or watching television 0   Moving or speaking so slowly that other people could have noticed. Or the opposite - being so fidgety or restless that you have been moving around a lot more than usual 0   Thoughts that you would be better off dead, or of hurting yourself in some way 0   Total Score 12   If you checked off any problems, how difficult have these problems made it for you to do your  work, take care of things at home, or get along with other people? Somewhat difficult         Lab on 09/05/2017   Component Date Value Ref Range Status   • TSH 09/05/2017 2.100  0.460 - 4.680 mIU/mL Final   • Free T4 09/05/2017 1.43  0.78 - 2.19 ng/dL Final   Lab on 06/20/2017   Component Date Value Ref Range Status   • Glucose 06/20/2017 113* 70 - 100 mg/dL Final   • BUN 06/20/2017 21  8 - 25 mg/dL Final   • Creatinine 06/20/2017 1.20  0.40 - 1.30 mg/dL Final   • Sodium 06/20/2017 140  134 - 146 mmol/L Final   • Potassium 06/20/2017 4.3  3.4 - 5.4 mmol/L Final   • Chloride 06/20/2017 103  100 - 112 mmol/L Final   • CO2 06/20/2017 26.0  20.0 - 32.0 mmol/L Final   • Calcium 06/20/2017 9.3  8.4 - 10.8 mg/dL Final   • Total Protein 06/20/2017 7.4  6.7 - 8.2 g/dL Final   • Albumin 06/20/2017 4.20  3.20 - 5.50 g/dL Final   • ALT (SGPT) 06/20/2017 23  10 - 60 U/L Final   • AST (SGOT) 06/20/2017 27  10 - 60 U/L Final   • Alkaline Phosphatase 06/20/2017 113  15 - 121 U/L Final   • Total Bilirubin 06/20/2017 0.6  0.2 - 1.0 mg/dL Final   • eGFR Non  Amer 06/20/2017 62  56 - 130 mL/min/1.73 Final   • Globulin 06/20/2017 3.2  2.5 - 4.6 gm/dL Final   • A/G Ratio 06/20/2017 1.3  1.0 - 3.0 g/dL Final   • BUN/Creatinine Ratio 06/20/2017 17.5  7.0 - 25.0 Final   • Anion Gap 06/20/2017 11.0  5.0 - 15.0 mmol/L Final   • Hemoglobin A1C 06/20/2017 5.73* 4 - 5.6 % Final   • Total Cholesterol 06/20/2017 157  150 - 200 mg/dL Final   • Triglycerides 06/20/2017 165* 35 - 160 mg/dL Final   • HDL Cholesterol 06/20/2017 40  35 - 100 mg/dL Final   • LDL Cholesterol  06/20/2017 84  mg/dL Final   • VLDL Cholesterol 06/20/2017 33  mg/dL Final   • LDL/HDL Ratio 06/20/2017 2.10   Final   • TSH 06/20/2017 5.250* 0.460 - 4.680 mIU/mL Final   • PSA 06/20/2017 0.922  0.000 - 4.000 ng/mL Final   • WBC 06/20/2017 8.02  3.20 - 9.80 10*3/mm3 Final   • RBC 06/20/2017 5.11  4.37 - 5.74 10*6/mm3 Final   • Hemoglobin 06/20/2017 15.3  13.7 - 17.3 g/dL  Final   • Hematocrit 06/20/2017 46.0  39.0 - 49.0 % Final   • MCV 06/20/2017 90.0  80.0 - 98.0 fL Final   • MCH 06/20/2017 29.9  26.5 - 34.0 pg Final   • MCHC 06/20/2017 33.3  31.5 - 36.3 g/dL Final   • RDW 06/20/2017 13.4  11.5 - 14.5 % Final   • RDW-SD 06/20/2017 43.7  35.1 - 43.9 fl Final   • MPV 06/20/2017 9.8  8.0 - 12.0 fL Final   • Platelets 06/20/2017 329  150 - 450 10*3/mm3 Final   • Neutrophil % 06/20/2017 63.1  37.0 - 80.0 % Final   • Lymphocyte % 06/20/2017 20.8  10.0 - 50.0 % Final   • Monocyte % 06/20/2017 11.1  0.0 - 12.0 % Final   • Eosinophil % 06/20/2017 4.6  0.0 - 7.0 % Final   • Basophil % 06/20/2017 0.4  0.0 - 2.0 % Final   • Neutrophils, Absolute 06/20/2017 5.06  2.00 - 8.60 10*3/mm3 Final   • Lymphocytes, Absolute 06/20/2017 1.67  0.60 - 4.20 10*3/mm3 Final   • Monocytes, Absolute 06/20/2017 0.89  0.00 - 0.90 10*3/mm3 Final   • Eosinophils, Absolute 06/20/2017 0.37  0.00 - 0.70 10*3/mm3 Final   • Basophils, Absolute 06/20/2017 0.03  0.00 - 0.20 10*3/mm3 Final   ]

## 2017-09-19 ENCOUNTER — PREP FOR SURGERY (OUTPATIENT)
Dept: OTHER | Facility: HOSPITAL | Age: 57
End: 2017-09-19

## 2017-09-19 ENCOUNTER — OFFICE VISIT (OUTPATIENT)
Dept: CARDIOLOGY | Facility: CLINIC | Age: 57
End: 2017-09-19

## 2017-09-19 VITALS
DIASTOLIC BLOOD PRESSURE: 84 MMHG | SYSTOLIC BLOOD PRESSURE: 120 MMHG | BODY MASS INDEX: 33.9 KG/M2 | HEIGHT: 67 IN | OXYGEN SATURATION: 97 % | HEART RATE: 75 BPM | RESPIRATION RATE: 16 BRPM | WEIGHT: 216 LBS

## 2017-09-19 DIAGNOSIS — E78.5 HYPERLIPIDEMIA, UNSPECIFIED HYPERLIPIDEMIA TYPE: Primary | ICD-10-CM

## 2017-09-19 DIAGNOSIS — I20.9 ANGINA PECTORIS (HCC): ICD-10-CM

## 2017-09-19 DIAGNOSIS — I20.0 UNSTABLE ANGINA (HCC): Primary | ICD-10-CM

## 2017-09-19 DIAGNOSIS — I25.10 CORONARY ARTERY DISEASE INVOLVING NATIVE CORONARY ARTERY OF NATIVE HEART WITHOUT ANGINA PECTORIS: ICD-10-CM

## 2017-09-19 PROCEDURE — 99213 OFFICE O/P EST LOW 20 MIN: CPT | Performed by: INTERNAL MEDICINE

## 2017-09-19 PROCEDURE — 93005 ELECTROCARDIOGRAM TRACING: CPT | Performed by: INTERNAL MEDICINE

## 2017-09-19 PROCEDURE — 93010 ELECTROCARDIOGRAM REPORT: CPT | Performed by: INTERNAL MEDICINE

## 2017-09-19 RX ORDER — SODIUM CHLORIDE 9 MG/ML
50 INJECTION, SOLUTION INTRAVENOUS CONTINUOUS
Status: CANCELLED | OUTPATIENT
Start: 2017-09-21

## 2017-09-19 RX ORDER — SODIUM CHLORIDE 0.9 % (FLUSH) 0.9 %
1-10 SYRINGE (ML) INJECTION AS NEEDED
Status: CANCELLED | OUTPATIENT
Start: 2017-09-21

## 2017-09-19 RX ORDER — PRAVASTATIN SODIUM 20 MG
20 TABLET ORAL NIGHTLY
COMMUNITY
End: 2018-07-10 | Stop reason: DRUGHIGH

## 2017-09-19 RX ORDER — CLOPIDOGREL BISULFATE 75 MG/1
75 TABLET ORAL DAILY
Qty: 30 TABLET | Refills: 11 | Status: SHIPPED | OUTPATIENT
Start: 2017-09-19

## 2017-09-19 RX ORDER — MONTELUKAST SODIUM 10 MG/1
10 TABLET ORAL NIGHTLY
COMMUNITY
End: 2017-10-18 | Stop reason: SDUPTHER

## 2017-09-19 RX ORDER — ASPIRIN 81 MG/1
81 TABLET, CHEWABLE ORAL DAILY
COMMUNITY
End: 2019-08-14

## 2017-09-19 RX ORDER — ISOSORBIDE MONONITRATE 30 MG/1
30 TABLET, EXTENDED RELEASE ORAL DAILY
Qty: 30 TABLET | Refills: 11 | Status: SHIPPED | OUTPATIENT
Start: 2017-09-19 | End: 2017-10-10 | Stop reason: SDDI

## 2017-09-19 NOTE — PROGRESS NOTES
"Chief complaint : Angina pectoris    History of Present Illness very pleasant 57-year-old gentleman who comes today for evaluation of chest pain.  Patient stated that his chest pain started approximately 6 months ago and has progressively gotten worse to the point where it is happening every day.  As mentioned below.  He describes it as pressure something sitting on his chest.  He had an episode yesterday which was very intense 10 out of 10 associated with diaphoresis and dizziness.  Patient stated that it lasted for approximately 2 minutes yesterday.  Patient does have history of coronary artery disease and stent placement to the LAD.  Patient has stopped taking the Plavix approximately one year after his last stent placement.    · Pain in left side of radiating to back. Started getting worse everyday. Patient has shortness of breath, dizziness and sweating. Pain radiates to left arm. Pain last 2 minutes off and on but sometimes up to 2-3 hours. \" Feels like something setting on my chest.\"Rates pain #10 on pain scale yesterday.\" Took asa 81mg and pain eased. Started on thyroid medication 2 months ago. Fatigued.          Review of Systems   Cardiovascular: Positive for chest pain and dyspnea on exertion.   Respiratory: Positive for shortness of breath.        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 He has never used smokeless tobacco. He reports that he does not drink alcohol or use illicit drugs.    Past Surgical History:   Procedure Laterality Date   • APPENDECTOMY  1970   • CARDIAC CATHETERIZATION  07/13/2016    Cardiac cath 78975 (1)      • CORONARY STENT PLACEMENT     • INJECTION OF MEDICATION  08/08/2014    B12 (1)     • INJECTION OF MEDICATION  01/20/2015    Depo Medrol (Methylprednisone) 80mg (3)    • INJECTION OF MEDICATION  10/19/2015    Dexamethasone (1)      • KNEE ARTHROSCOPY  11/05/1987    Anesth, knee arthroscopy (1)      • SINUS SURGERY  1995       No Known Allergies    Current Outpatient Prescriptions   Medication Sig Dispense Refill   • albuterol (PROVENTIL HFA;VENTOLIN HFA) 108 (90 BASE) MCG/ACT inhaler Inhale 2 puffs Every 6 (Six) Hours As Needed for wheezing. 1 inhaler 5   • aspirin 81 MG chewable tablet Chew 81 mg Daily.     • levothyroxine (SYNTHROID) 75 MCG tablet Take 1 tablet by mouth Daily. 30 tablet 3   • mometasone-formoterol (DULERA 200) 200-5 MCG/ACT inhaler Inhale 2 puffs 2 (Two) Times a Day. 13 g 5   • montelukast (SINGULAIR) 10 MG tablet TAKE 1 TABLET BY MOUTH DAILY 30 tablet 5   • pravastatin (PRAVACHOL) 20 MG tablet TAKE 1 TABLET BY MOUTH DAILY AT BEDTIME 30 tablet 7   • clopidogrel (PLAVIX) 75 MG tablet TAKE 1 TABLET BY MOUTH DAILY. 30 tablet 5   • meloxicam (MOBIC) 15 MG tablet Take 1 tablet by mouth Daily. 30 tablet 2     No current facility-administered medications for this visit.        Objective     Vital Signs  Heart Rate:  [75] 75  Resp:  [16] 16  BP: (120)/(84)  120/84    Physical Exam   Constitutional: He is oriented to person, place, and time. He appears well-developed and well-nourished.   HENT:   Head: Normocephalic and atraumatic.   Eyes: Conjunctivae and EOM are normal. Pupils are equal, round, and reactive to light.   Neck: Neck supple. No JVD present. Carotid bruit is not present. No tracheal deviation and no edema present.   Cardiovascular: Normal rate, regular rhythm, S1 normal, S2 normal, normal heart sounds and intact distal pulses.  Exam reveals no gallop, no S3, no S4 and no friction rub.    No murmur heard.  Pulmonary/Chest: Effort normal and breath sounds normal. He has no wheezes. He has no rales. He exhibits no tenderness.   Abdominal: Bowel sounds are normal. He exhibits no abdominal bruit and no pulsatile midline mass. There is no rebound and no guarding.   Musculoskeletal: Normal range of motion. He exhibits no edema.   Neurological: He is alert and oriented to person, place, and time.   Skin: Skin is warm and dry.   Psychiatric: He has a normal mood and affect.         ECG 12 Lead  Date/Time: 9/19/2017 4:49 PM  Performed by: FRANCO CONCEPCION  Authorized by: FRANCO CONCEPCION   Comparison: compared with previous ECG from 4/13/2017  Similar to previous ECG  Rhythm: sinus rhythm  Rate: normal  Conduction: conduction normal  ST Segments: ST segments normal  T Waves: T waves normal  QRS axis: normal  Other: no other findings  Clinical impression: normal ECG            Assessment/Plan     1. Hyperlipidemia, unspecified hyperlipidemia type    2. Coronary artery disease involving native coronary artery of native heart without angina pectoris    3. Angina pectoris        Plan:  · The patient's unstable nature of his chest discomfort I have recommended that he undergoes diagnostic coronary angiogram and possible PCI.    · I have prescribed Imdur 30 mg by mouth daily  · I have instructed patient to start taking Plavix 75 mg daily  · I have discussed the indication  alternatives and all potential, occasions of diagnostic coronary angiogram and possible PCI with the patient.  He has verbalized understanding and has given me permission to proceed.  · ASA/Mallampati: 1/1    I discussed the patients findings and my recommendations with patient.          This document has been electronically signed by Joy Henry MD on September 19, 2017 5:05 PM     Joy Henry MD  09/19/17  1:54 PM

## 2017-09-21 ENCOUNTER — HOSPITAL ENCOUNTER (OUTPATIENT)
Facility: HOSPITAL | Age: 57
Setting detail: HOSPITAL OUTPATIENT SURGERY
Discharge: HOME | End: 2017-09-21
Attending: INTERNAL MEDICINE | Admitting: INTERNAL MEDICINE

## 2017-09-21 VITALS
DIASTOLIC BLOOD PRESSURE: 73 MMHG | HEIGHT: 66 IN | WEIGHT: 208.56 LBS | TEMPERATURE: 97.1 F | BODY MASS INDEX: 33.52 KG/M2 | RESPIRATION RATE: 18 BRPM | HEART RATE: 77 BPM | OXYGEN SATURATION: 95 % | SYSTOLIC BLOOD PRESSURE: 121 MMHG

## 2017-09-21 DIAGNOSIS — I20.0 UNSTABLE ANGINA (HCC): ICD-10-CM

## 2017-09-21 LAB
ALBUMIN SERPL-MCNC: 4.1 G/DL (ref 3.4–4.8)
ALBUMIN/GLOB SERPL: 1.6 G/DL (ref 1.1–1.8)
ALP SERPL-CCNC: 116 U/L (ref 38–126)
ALT SERPL W P-5'-P-CCNC: 39 U/L (ref 21–72)
ANION GAP SERPL CALCULATED.3IONS-SCNC: 10 MMOL/L (ref 5–15)
AST SERPL-CCNC: 24 U/L (ref 17–59)
BILIRUB SERPL-MCNC: 0.7 MG/DL (ref 0.2–1.3)
BUN BLD-MCNC: 20 MG/DL (ref 7–21)
BUN/CREAT SERPL: 20.4 (ref 7–25)
CALCIUM SPEC-SCNC: 8.9 MG/DL (ref 8.4–10.2)
CHLORIDE SERPL-SCNC: 106 MMOL/L (ref 95–110)
CO2 SERPL-SCNC: 25 MMOL/L (ref 22–31)
CREAT BLD-MCNC: 0.98 MG/DL (ref 0.7–1.3)
DEPRECATED RDW RBC AUTO: 42.5 FL (ref 35.1–43.9)
ERYTHROCYTE [DISTWIDTH] IN BLOOD BY AUTOMATED COUNT: 13.1 % (ref 11.5–14.5)
GFR SERPL CREATININE-BSD FRML MDRD: 78 ML/MIN/1.73 (ref 56–130)
GLOBULIN UR ELPH-MCNC: 2.6 GM/DL (ref 2.3–3.5)
GLUCOSE BLD-MCNC: 88 MG/DL (ref 60–100)
HCT VFR BLD AUTO: 42.4 % (ref 39–49)
HGB BLD-MCNC: 14.3 G/DL (ref 13.7–17.3)
INR PPP: 0.95 (ref 0.8–1.2)
MCH RBC QN AUTO: 29.9 PG (ref 26.5–34)
MCHC RBC AUTO-ENTMCNC: 33.7 G/DL (ref 31.5–36.3)
MCV RBC AUTO: 88.5 FL (ref 80–98)
PLATELET # BLD AUTO: 258 10*3/MM3 (ref 150–450)
PMV BLD AUTO: 10 FL (ref 8–12)
POTASSIUM BLD-SCNC: 3.8 MMOL/L (ref 3.5–5.1)
PROT SERPL-MCNC: 6.7 G/DL (ref 6.3–8.6)
PROTHROMBIN TIME: 12.6 SECONDS (ref 11.1–15.3)
RBC # BLD AUTO: 4.79 10*6/MM3 (ref 4.37–5.74)
SODIUM BLD-SCNC: 141 MMOL/L (ref 137–145)
WBC NRBC COR # BLD: 7.86 10*3/MM3 (ref 3.2–9.8)

## 2017-09-21 PROCEDURE — 85027 COMPLETE CBC AUTOMATED: CPT | Performed by: INTERNAL MEDICINE

## 2017-09-21 PROCEDURE — 25010000002 MIDAZOLAM PER 1 MG: Performed by: INTERNAL MEDICINE

## 2017-09-21 PROCEDURE — 0 IOPAMIDOL PER 1 ML: Performed by: INTERNAL MEDICINE

## 2017-09-21 PROCEDURE — C1769 GUIDE WIRE: HCPCS | Performed by: INTERNAL MEDICINE

## 2017-09-21 PROCEDURE — 25010000002 FENTANYL CITRATE (PF) 100 MCG/2ML SOLUTION: Performed by: INTERNAL MEDICINE

## 2017-09-21 PROCEDURE — 80053 COMPREHEN METABOLIC PANEL: CPT | Performed by: INTERNAL MEDICINE

## 2017-09-21 PROCEDURE — 93458 L HRT ARTERY/VENTRICLE ANGIO: CPT | Performed by: INTERNAL MEDICINE

## 2017-09-21 PROCEDURE — 85610 PROTHROMBIN TIME: CPT | Performed by: INTERNAL MEDICINE

## 2017-09-21 PROCEDURE — C1894 INTRO/SHEATH, NON-LASER: HCPCS | Performed by: INTERNAL MEDICINE

## 2017-09-21 PROCEDURE — 25010000002 HEPARIN (PORCINE) PER 1000 UNITS: Performed by: INTERNAL MEDICINE

## 2017-09-21 RX ORDER — LIDOCAINE HYDROCHLORIDE 20 MG/ML
INJECTION, SOLUTION INFILTRATION; PERINEURAL AS NEEDED
Status: DISCONTINUED | OUTPATIENT
Start: 2017-09-21 | End: 2017-09-21 | Stop reason: HOSPADM

## 2017-09-21 RX ORDER — SODIUM CHLORIDE 0.9 % (FLUSH) 0.9 %
1-10 SYRINGE (ML) INJECTION AS NEEDED
Status: DISCONTINUED | OUTPATIENT
Start: 2017-09-21 | End: 2017-09-21 | Stop reason: HOSPADM

## 2017-09-21 RX ORDER — SODIUM CHLORIDE 9 MG/ML
INJECTION, SOLUTION INTRAVENOUS CONTINUOUS PRN
Status: DISCONTINUED | OUTPATIENT
Start: 2017-09-21 | End: 2017-09-21 | Stop reason: HOSPADM

## 2017-09-21 RX ORDER — ACETAMINOPHEN 325 MG/1
650 TABLET ORAL EVERY 4 HOURS PRN
Status: DISCONTINUED | OUTPATIENT
Start: 2017-09-21 | End: 2017-09-21 | Stop reason: HOSPADM

## 2017-09-21 RX ORDER — SODIUM CHLORIDE 9 MG/ML
125 INJECTION, SOLUTION INTRAVENOUS CONTINUOUS
Status: DISCONTINUED | OUTPATIENT
Start: 2017-09-21 | End: 2017-09-21 | Stop reason: HOSPADM

## 2017-09-21 RX ORDER — FENTANYL CITRATE 50 UG/ML
INJECTION, SOLUTION INTRAMUSCULAR; INTRAVENOUS AS NEEDED
Status: DISCONTINUED | OUTPATIENT
Start: 2017-09-21 | End: 2017-09-21 | Stop reason: HOSPADM

## 2017-09-21 RX ORDER — NITROGLYCERIN 5 MG/ML
INJECTION, SOLUTION INTRAVENOUS AS NEEDED
Status: DISCONTINUED | OUTPATIENT
Start: 2017-09-21 | End: 2017-09-21 | Stop reason: HOSPADM

## 2017-09-21 RX ORDER — SODIUM CHLORIDE 9 MG/ML
50 INJECTION, SOLUTION INTRAVENOUS CONTINUOUS
Status: DISCONTINUED | OUTPATIENT
Start: 2017-09-21 | End: 2017-09-21 | Stop reason: HOSPADM

## 2017-09-21 RX ORDER — MIDAZOLAM HYDROCHLORIDE 1 MG/ML
INJECTION INTRAMUSCULAR; INTRAVENOUS AS NEEDED
Status: DISCONTINUED | OUTPATIENT
Start: 2017-09-21 | End: 2017-09-21 | Stop reason: HOSPADM

## 2017-09-21 RX ADMIN — SODIUM CHLORIDE 50 ML/HR: 900 INJECTION, SOLUTION INTRAVENOUS at 13:11

## 2017-10-10 ENCOUNTER — OFFICE VISIT (OUTPATIENT)
Dept: FAMILY MEDICINE CLINIC | Facility: CLINIC | Age: 57
End: 2017-10-10

## 2017-10-10 VITALS
TEMPERATURE: 98 F | WEIGHT: 216.8 LBS | BODY MASS INDEX: 34.03 KG/M2 | OXYGEN SATURATION: 96 % | DIASTOLIC BLOOD PRESSURE: 78 MMHG | RESPIRATION RATE: 16 BRPM | HEART RATE: 84 BPM | HEIGHT: 67 IN | SYSTOLIC BLOOD PRESSURE: 118 MMHG

## 2017-10-10 DIAGNOSIS — Z12.11 ENCOUNTER FOR SCREENING COLONOSCOPY: Primary | ICD-10-CM

## 2017-10-10 PROCEDURE — 99212 OFFICE O/P EST SF 10 MIN: CPT | Performed by: FAMILY MEDICINE

## 2017-10-10 NOTE — PROGRESS NOTES
Subjective   Prudencio Rodriguez is a 57 y.o. male.   Chief Complaint   Patient presents with   • Colonoscopy     referral       History of Present Illness   Patient presents today requesting referral for colonoscopy.  His been about 6 years since his last colonoscopy and he denies any acute problems    The following portions of the patient's history were reviewed and updated as appropriate: allergies, current medications, past family history, past medical history, past social history, past surgical history and problem list.    Review of Systems   Constitutional: Positive for fatigue.   Eyes: Negative.    Respiratory: Negative.    Cardiovascular: Negative.    Gastrointestinal: Negative.    Endocrine: Negative.    Genitourinary: Negative.    Musculoskeletal: Negative.    Skin: Negative.    Allergic/Immunologic: Negative.    Neurological: Positive for headaches.   Hematological: Negative.    Psychiatric/Behavioral: Negative.    All other systems reviewed and are negative.      Objective   Physical Exam   Constitutional: He is oriented to person, place, and time. He appears well-developed and well-nourished.   HENT:   Head: Normocephalic and atraumatic.   Right Ear: External ear normal.   Left Ear: External ear normal.   Nose: Nose normal.   Mouth/Throat: Oropharynx is clear and moist.   Eyes: Conjunctivae and EOM are normal. Pupils are equal, round, and reactive to light.   Neck: Normal range of motion. Neck supple.   Cardiovascular: Normal rate, regular rhythm, normal heart sounds and intact distal pulses.  Exam reveals no gallop and no friction rub.    No murmur heard.  Pulmonary/Chest: Effort normal and breath sounds normal. He has no wheezes. He has no rales.   Abdominal: Soft. Bowel sounds are normal. He exhibits no mass. There is no tenderness. There is no rebound and no guarding.   Musculoskeletal: Normal range of motion.   Neurological: He is alert and oriented to person, place, and time. He has normal  reflexes. No cranial nerve deficit. He exhibits normal muscle tone.   Skin: Skin is warm and dry. No rash noted.   Psychiatric: He has a normal mood and affect. His behavior is normal. Judgment and thought content normal.   Nursing note and vitals reviewed.      Assessment/Plan   Prudencio was seen today for colonoscopy.    Diagnoses and all orders for this visit:    Encounter for screening colonoscopy  -     Ambulatory Referral to General Surgery

## 2017-10-17 RX ORDER — MONTELUKAST SODIUM 10 MG/1
TABLET ORAL
Qty: 30 TABLET | Refills: 6 | Status: SHIPPED | OUTPATIENT
Start: 2017-10-17 | End: 2018-09-26

## 2017-10-17 RX ORDER — ALBUTEROL SULFATE 90 UG/1
2 AEROSOL, METERED RESPIRATORY (INHALATION) EVERY 4 HOURS PRN
Qty: 18 G | Refills: 5 | Status: SHIPPED | OUTPATIENT
Start: 2017-10-17

## 2017-10-18 ENCOUNTER — CONSULT (OUTPATIENT)
Dept: SURGERY | Facility: CLINIC | Age: 57
End: 2017-10-18

## 2017-10-18 VITALS
DIASTOLIC BLOOD PRESSURE: 68 MMHG | HEIGHT: 67 IN | SYSTOLIC BLOOD PRESSURE: 120 MMHG | WEIGHT: 219 LBS | BODY MASS INDEX: 34.37 KG/M2

## 2017-10-18 DIAGNOSIS — Z87.19 HISTORY OF ULCERATIVE COLITIS: Primary | ICD-10-CM

## 2017-10-18 PROCEDURE — 99201 PR OFFICE OUTPATIENT NEW 10 MINUTES: CPT | Performed by: SURGERY

## 2017-10-18 NOTE — PROGRESS NOTES
Subjective   Prudencio Rodriguez is a 57 y.o. male     History of Present Illness referred by Dr. Petersen for colonoscopy.  Patient has a history of significant ulcerative colitis in the past and last colonoscopy was done by Dr. Porras in 2011.  Patient is not on any medications currently for ulcerative colitis and does not have any symptoms related to   ulcerative colitis currently.         Review of Systems   Constitutional: Negative.    HENT: Negative.    Eyes: Negative.    Respiratory: Negative.    Cardiovascular: Negative.    Gastrointestinal: Negative.    Endocrine: Negative.    Genitourinary: Negative.    Musculoskeletal: Negative.    Skin: Negative.    Allergic/Immunologic: Negative.    Neurological: Negative.    Hematological: Negative.    Psychiatric/Behavioral: Negative.      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      • Malaise and fatigue    • Pain in lower limb    • Tachycardia    • Vitamin B12 deficiency (non anemic)      Past Surgical History:   Procedure Laterality Date   • APPENDECTOMY  1970   • CARDIAC CATHETERIZATION  07/13/2016    Cardiac cath [id number] (1)      • CARDIAC CATHETERIZATION N/A 9/21/2017    Procedure: Left Heart Cath;  Surgeon: Joy Henry MD;  Location: [facility] INVASIVE LOCATION;  Service:    • CORONARY STENT PLACEMENT     • INJECTION OF MEDICATION  08/08/2014    B12 (1)     • INJECTION OF  MEDICATION  01/20/2015    Depo Medrol (Methylprednisone) 80mg (3)    • INJECTION OF MEDICATION  10/19/2015    Dexamethasone (1)      • KNEE ARTHROSCOPY  11/05/1987    Anesth, knee arthroscopy (1)      • SINUS SURGERY  1995     Family History   Problem Relation Age of Onset   • Hypertension Other    • Thyroid disease Other      Thyroid Disorder   • Cancer Mother    • Diabetes Mother    • Hypertension Mother    • Diabetes insipidus Mother    • Heart disease Father    • Alzheimer's disease Father      Social History     Social History   • Marital status:      Spouse name: N/A   • Number of children: N/A   • Years of education: N/A     Occupational History   • Not on file.     Social History Main Topics   • Smoking status: Never Smoker   • Smokeless tobacco: Never Used   • Alcohol use No   • Drug use: No   • Sexual activity: Defer     Other Topics Concern   • Not on file     Social History Narrative     No Known Allergies    Home Medications:  Prior to Admission medications    Medication Sig Start Date End Date Taking? Authorizing Provider   albuterol (PROAIR HFA) 108 (90 Base) MCG/ACT inhaler Inhale 2 puffs Every 4 (Four) Hours As Needed for Wheezing. 10/17/17  Yes Amandeep Petersen MD   aspirin 81 MG chewable tablet Chew 81 mg Daily.   Yes Historical Provider, MD   clopidogrel (PLAVIX) 75 MG tablet Take 1 tablet by mouth Daily.  Patient taking differently: Take 75 mg by mouth Every Night. 9/19/17  Yes Joy Henry MD   levothyroxine (SYNTHROID) 75 MCG tablet Take 1 tablet by mouth Daily. 9/13/17  Yes Amandeep Petersen MD   mometasone-formoterol (DULERA 200) 200-5 MCG/ACT inhaler Inhale 2 puffs 2 (Two) Times a Day.  Patient taking differently: Inhale 2 puffs 2 (Two) Times a Day As Needed. 10/21/16  Yes Amandeep Petersen MD   montelukast (SINGULAIR) 10 MG tablet TAKE 1 TABLET BY MOUTH DAILY 10/17/17  Yes Amandeep Petersen MD   pravastatin (PRAVACHOL) 20 MG tablet Take 20 mg by mouth Every Night.   Yes  Historical Provider, MD   montelukast (SINGULAIR) 10 MG tablet Take 10 mg by mouth Every Night.  10/18/17  Historical Provider, MD       Objective   Physical Exam  not done    Assessment/Plan   history of colitis.  Will assist patient with getting in see Dr. Porras for follow-up colonoscopy.  Fully discussed this with the patient clearly understands and agrees with that plan      There were no encounter diagnoses.  Screening colonoscopy                     This document has been electronically signed by Claudette Klein CSA on October 18, 2017 3:38 PM

## 2017-10-20 ENCOUNTER — OFFICE VISIT (OUTPATIENT)
Dept: FAMILY MEDICINE CLINIC | Facility: CLINIC | Age: 57
End: 2017-10-20

## 2017-10-20 VITALS
DIASTOLIC BLOOD PRESSURE: 92 MMHG | TEMPERATURE: 98.1 F | OXYGEN SATURATION: 95 % | RESPIRATION RATE: 16 BRPM | BODY MASS INDEX: 33.9 KG/M2 | HEIGHT: 67 IN | HEART RATE: 89 BPM | WEIGHT: 216 LBS | SYSTOLIC BLOOD PRESSURE: 130 MMHG

## 2017-10-20 DIAGNOSIS — G47.33 OBSTRUCTIVE SLEEP APNEA SYNDROME: Primary | ICD-10-CM

## 2017-10-20 PROCEDURE — 99212 OFFICE O/P EST SF 10 MIN: CPT | Performed by: FAMILY MEDICINE

## 2017-10-20 NOTE — PROGRESS NOTES
Subjective   Prudencio Rodriguez is a 57 y.o. male.   Chief Complaint   Patient presents with   • C pap supplies       History of Present Illness   Patient with history of obstructive sleep apnea in today for reevaluation and requesting CPAP supplies.  I have reviewed his CPAP summation showing a percent usage at mean pressure of 11.7 cm of H2O.  Average time is 7 hours 30 minutes.  No evidence of leaks and he is showing good clinical response.    The following portions of the patient's history were reviewed and updated as appropriate: allergies, current medications, past family history, past medical history, past social history, past surgical history and problem list.    Review of Systems   Constitutional: Negative.    HENT: Negative.    Eyes: Negative.    Respiratory: Negative.    Cardiovascular: Negative.    Gastrointestinal: Negative.    Endocrine: Negative.    Genitourinary: Negative.    Musculoskeletal: Negative.    Skin: Negative.    Allergic/Immunologic: Negative.    Neurological: Negative.    Hematological: Negative.    Psychiatric/Behavioral: Negative.    All other systems reviewed and are negative.      Objective   Physical Exam   Constitutional: He is oriented to person, place, and time. He appears well-developed and well-nourished.   HENT:   Head: Normocephalic and atraumatic.   Right Ear: External ear normal.   Left Ear: External ear normal.   Nose: Nose normal.   Mouth/Throat: Oropharynx is clear and moist.   Eyes: Conjunctivae and EOM are normal. Pupils are equal, round, and reactive to light.   Neck: Normal range of motion. Neck supple.   Cardiovascular: Normal rate, regular rhythm, normal heart sounds and intact distal pulses.  Exam reveals no gallop and no friction rub.    No murmur heard.  Pulmonary/Chest: Effort normal and breath sounds normal. He has no wheezes. He has no rales.   Abdominal: Soft. Bowel sounds are normal. He exhibits no mass. There is no tenderness. There is no rebound and no  guarding.   Musculoskeletal: Normal range of motion.   Neurological: He is alert and oriented to person, place, and time. He has normal reflexes. No cranial nerve deficit. He exhibits normal muscle tone.   Skin: Skin is warm and dry. No rash noted.   Psychiatric: He has a normal mood and affect. His behavior is normal. Judgment and thought content normal.   Nursing note and vitals reviewed.      Assessment/Plan   Prudencio was seen today for c pap supplies.    Diagnoses and all orders for this visit:    Obstructive sleep apnea syndrome        Prescription given for CPAP supplies

## 2017-11-17 RX ORDER — MOMETASONE FUROATE AND FORMOTEROL FUMARATE DIHYDRATE 200; 5 UG/1; UG/1
AEROSOL RESPIRATORY (INHALATION)
Qty: 13 G | Refills: 5 | Status: SHIPPED | OUTPATIENT
Start: 2017-11-17 | End: 2018-09-26

## 2017-11-20 ENCOUNTER — OFFICE VISIT (OUTPATIENT)
Dept: FAMILY MEDICINE CLINIC | Facility: CLINIC | Age: 57
End: 2017-11-20

## 2017-11-20 VITALS
BODY MASS INDEX: 34 KG/M2 | HEIGHT: 67 IN | WEIGHT: 216.6 LBS | TEMPERATURE: 98 F | HEART RATE: 84 BPM | OXYGEN SATURATION: 93 % | SYSTOLIC BLOOD PRESSURE: 124 MMHG | RESPIRATION RATE: 16 BRPM | DIASTOLIC BLOOD PRESSURE: 88 MMHG

## 2017-11-20 DIAGNOSIS — J01.00 ACUTE NON-RECURRENT MAXILLARY SINUSITIS: Primary | ICD-10-CM

## 2017-11-20 DIAGNOSIS — J45.20 MILD INTERMITTENT ASTHMA WITHOUT COMPLICATION: ICD-10-CM

## 2017-11-20 PROCEDURE — 96372 THER/PROPH/DIAG INJ SC/IM: CPT | Performed by: FAMILY MEDICINE

## 2017-11-20 PROCEDURE — 99213 OFFICE O/P EST LOW 20 MIN: CPT | Performed by: FAMILY MEDICINE

## 2017-11-20 RX ORDER — AZITHROMYCIN 250 MG/1
TABLET, FILM COATED ORAL
Qty: 6 TABLET | Refills: 0 | Status: SHIPPED | OUTPATIENT
Start: 2017-11-20 | End: 2017-12-20

## 2017-11-20 RX ORDER — METHYLPREDNISOLONE ACETATE 80 MG/ML
80 INJECTION, SUSPENSION INTRA-ARTICULAR; INTRALESIONAL; INTRAMUSCULAR; SOFT TISSUE ONCE
Status: COMPLETED | OUTPATIENT
Start: 2017-11-20 | End: 2017-11-20

## 2017-11-20 RX ADMIN — METHYLPREDNISOLONE ACETATE 80 MG: 80 INJECTION, SUSPENSION INTRA-ARTICULAR; INTRALESIONAL; INTRAMUSCULAR; SOFT TISSUE at 15:34

## 2017-11-20 NOTE — PROGRESS NOTES
Subjective   Prudencio Rodriguez is a 57 y.o. male.   Chief Complaint   Patient presents with   • Sinusitis     onset 10 days ago       Sinusitis   This is a new problem. The current episode started 1 to 4 weeks ago. The problem has been waxing and waning since onset. There has been no fever. Associated symptoms include congestion, sinus pressure and a sore throat.        The following portions of the patient's history were reviewed and updated as appropriate: allergies, current medications, past family history, past medical history, past social history, past surgical history and problem list.    Review of Systems   Constitutional: Negative.    HENT: Positive for congestion, sinus pressure and sore throat.    Eyes: Negative.    Respiratory: Negative.    Cardiovascular: Negative.    Gastrointestinal: Negative.    Endocrine: Negative.    Genitourinary: Negative.    Musculoskeletal: Negative.    Skin: Negative.    Allergic/Immunologic: Negative.    Neurological: Negative.    Hematological: Negative.    Psychiatric/Behavioral: Negative.    All other systems reviewed and are negative.      Objective   Physical Exam   Constitutional: He is oriented to person, place, and time. He appears well-developed and well-nourished.   HENT:   Head: Normocephalic and atraumatic.   Right Ear: External ear normal.   Left Ear: External ear normal.   Nose: Mucosal edema and rhinorrhea present.   Mouth/Throat: Posterior oropharyngeal edema and posterior oropharyngeal erythema present.   Eyes: Conjunctivae and EOM are normal. Pupils are equal, round, and reactive to light.   Neck: Normal range of motion. Neck supple.   Cardiovascular: Normal rate, regular rhythm, normal heart sounds and intact distal pulses.  Exam reveals no gallop and no friction rub.    No murmur heard.  Pulmonary/Chest: Effort normal. He has wheezes. He has no rales.   Abdominal: Soft. Bowel sounds are normal. He exhibits no mass. There is no tenderness. There is no  rebound and no guarding.   Musculoskeletal: Normal range of motion.   Neurological: He is alert and oriented to person, place, and time. He has normal reflexes. No cranial nerve deficit. He exhibits normal muscle tone.   Skin: Skin is warm and dry. No rash noted.   Psychiatric: He has a normal mood and affect. His behavior is normal. Judgment and thought content normal.   Nursing note and vitals reviewed.      Assessment/Plan   Prudencio was seen today for sinusitis.    Diagnoses and all orders for this visit:    Acute non-recurrent maxillary sinusitis  -     methylPREDNISolone acetate (DEPO-medrol) injection 80 mg; Inject 1 mL into the shoulder, thigh, or buttocks 1 (One) Time.    Mild intermittent asthma without complication    Other orders  -     azithromycin (ZITHROMAX Z-CHINA) 250 MG tablet; Take 2 tablets the first day, then 1 tablet daily for 4 days.

## 2017-12-13 ENCOUNTER — LAB (OUTPATIENT)
Dept: LAB | Facility: OTHER | Age: 57
End: 2017-12-13

## 2017-12-13 DIAGNOSIS — R73.9 HYPERGLYCEMIA: ICD-10-CM

## 2017-12-13 DIAGNOSIS — E03.9 ACQUIRED HYPOTHYROIDISM: ICD-10-CM

## 2017-12-13 LAB
ALBUMIN SERPL-MCNC: 4.2 G/DL (ref 3.2–5.5)
ALBUMIN/GLOB SERPL: 1.3 G/DL (ref 1–3)
ALP SERPL-CCNC: 114 U/L (ref 15–121)
ALT SERPL W P-5'-P-CCNC: 20 U/L (ref 10–60)
ANION GAP SERPL CALCULATED.3IONS-SCNC: 9 MMOL/L (ref 5–15)
ARTICHOKE IGE QN: 86 MG/DL (ref 0–129)
AST SERPL-CCNC: 24 U/L (ref 10–60)
BILIRUB SERPL-MCNC: 1 MG/DL (ref 0.2–1)
BUN BLD-MCNC: 23 MG/DL (ref 8–25)
BUN/CREAT SERPL: 23 (ref 7–25)
CALCIUM SPEC-SCNC: 9.2 MG/DL (ref 8.4–10.8)
CHLORIDE SERPL-SCNC: 105 MMOL/L (ref 100–112)
CO2 SERPL-SCNC: 26 MMOL/L (ref 20–32)
CREAT BLD-MCNC: 1 MG/DL (ref 0.4–1.3)
GFR SERPL CREATININE-BSD FRML MDRD: 76 ML/MIN/1.73 (ref 56–130)
GLOBULIN UR ELPH-MCNC: 3.3 GM/DL (ref 2.5–4.6)
GLUCOSE BLD-MCNC: 103 MG/DL (ref 70–100)
HBA1C MFR BLD: 5.6 % (ref 4–5.6)
POTASSIUM BLD-SCNC: 3.8 MMOL/L (ref 3.4–5.4)
PROT SERPL-MCNC: 7.5 G/DL (ref 6.7–8.2)
SODIUM BLD-SCNC: 140 MMOL/L (ref 134–146)
T4 FREE SERPL-MCNC: 1.59 NG/DL (ref 0.78–2.19)
TSH SERPL DL<=0.05 MIU/L-ACNC: 2.4 MIU/ML (ref 0.46–4.68)

## 2017-12-13 PROCEDURE — 80053 COMPREHEN METABOLIC PANEL: CPT | Performed by: FAMILY MEDICINE

## 2017-12-13 PROCEDURE — 83721 ASSAY OF BLOOD LIPOPROTEIN: CPT | Performed by: FAMILY MEDICINE

## 2017-12-13 PROCEDURE — 84443 ASSAY THYROID STIM HORMONE: CPT | Performed by: FAMILY MEDICINE

## 2017-12-13 PROCEDURE — 36415 COLL VENOUS BLD VENIPUNCTURE: CPT | Performed by: FAMILY MEDICINE

## 2017-12-13 PROCEDURE — 83036 HEMOGLOBIN GLYCOSYLATED A1C: CPT | Performed by: FAMILY MEDICINE

## 2017-12-13 PROCEDURE — 84439 ASSAY OF FREE THYROXINE: CPT | Performed by: FAMILY MEDICINE

## 2017-12-20 ENCOUNTER — OFFICE VISIT (OUTPATIENT)
Dept: FAMILY MEDICINE CLINIC | Facility: CLINIC | Age: 57
End: 2017-12-20

## 2017-12-20 VITALS
OXYGEN SATURATION: 95 % | HEIGHT: 67 IN | RESPIRATION RATE: 16 BRPM | WEIGHT: 204.8 LBS | HEART RATE: 86 BPM | DIASTOLIC BLOOD PRESSURE: 80 MMHG | SYSTOLIC BLOOD PRESSURE: 120 MMHG | BODY MASS INDEX: 32.15 KG/M2 | TEMPERATURE: 98.5 F

## 2017-12-20 DIAGNOSIS — E78.01 FAMILIAL HYPERCHOLESTEROLEMIA: Primary | ICD-10-CM

## 2017-12-20 DIAGNOSIS — J45.30 MILD PERSISTENT ASTHMA WITHOUT COMPLICATION: ICD-10-CM

## 2017-12-20 DIAGNOSIS — E03.9 ACQUIRED HYPOTHYROIDISM: ICD-10-CM

## 2017-12-20 DIAGNOSIS — R73.9 HYPERGLYCEMIA: ICD-10-CM

## 2017-12-20 DIAGNOSIS — I25.10 CORONARY ARTERY DISEASE INVOLVING NATIVE CORONARY ARTERY OF NATIVE HEART WITHOUT ANGINA PECTORIS: ICD-10-CM

## 2017-12-20 PROCEDURE — 99214 OFFICE O/P EST MOD 30 MIN: CPT | Performed by: FAMILY MEDICINE

## 2017-12-20 NOTE — PROGRESS NOTES
Subjective   Prudencio Rodriguez is a 57 y.o. male who presents to the office for follow-up and review of labs.     History of Present Illness   Patient with history of hypertension, hyperlipidemia, CAD, asthma is in today for reevaluation and review labs.  Patient is doing well at this time and voices no acute complaints.  Patient denies chest pain, PND, orthopnea.  Patient denies neurological symptoms.  The following portions of the patient's history were reviewed and updated as appropriate: allergies, current medications, past family history, past medical history, past social history, past surgical history and problem list.    Review of Systems   Constitutional: Negative.    HENT: Negative.    Eyes: Negative.    Respiratory: Negative.    Cardiovascular: Negative.    Gastrointestinal: Negative.    Endocrine: Negative.    Genitourinary: Negative.    Musculoskeletal: Negative.    Skin: Negative.    Allergic/Immunologic: Negative.    Neurological: Negative.    Hematological: Negative.    Psychiatric/Behavioral: Negative.    All other systems reviewed and are negative.      Objective   Physical Exam   Constitutional: He is oriented to person, place, and time. He appears well-developed and well-nourished.   HENT:   Head: Normocephalic and atraumatic.   Right Ear: External ear normal.   Left Ear: External ear normal.   Nose: Nose normal.   Mouth/Throat: Oropharynx is clear and moist.   Eyes: Conjunctivae and EOM are normal. Pupils are equal, round, and reactive to light.   Neck: Normal range of motion. Neck supple.   Cardiovascular: Normal rate, regular rhythm, normal heart sounds and intact distal pulses.  Exam reveals no gallop and no friction rub.    No murmur heard.  Pulmonary/Chest: Effort normal and breath sounds normal. He has no wheezes. He has no rales.   Abdominal: Soft. Bowel sounds are normal. He exhibits no mass. There is no tenderness. There is no rebound and no guarding.   Musculoskeletal: Normal range  of motion.   Neurological: He is alert and oriented to person, place, and time. He has normal reflexes. No cranial nerve deficit. He exhibits normal muscle tone.   Skin: Skin is warm and dry. No rash noted.   Psychiatric: He has a normal mood and affect. His behavior is normal. Judgment and thought content normal.   Nursing note and vitals reviewed.      Assessment/Plan   Prudencio was seen today for hyperlipidemia and hypothyroidism.    Diagnoses and all orders for this visit:    Familial hypercholesterolemia  -     Comprehensive Metabolic Panel; Future  -     LDL Cholesterol, Direct; Future    Coronary artery disease involving native coronary artery of native heart without angina pectoris    Mild persistent asthma without complication    Acquired hypothyroidism    Hyperglycemia  -     Hemoglobin A1c; Future    Continue current therapies.     Labs are reviewed with patient.    PHQ-2/PHQ-9 Depression Screening 7/12/2017   Little interest or pleasure in doing things 2   Feeling down, depressed, or hopeless 2   Trouble falling or staying asleep, or sleeping too much 3   Feeling tired or having little energy 3   Poor appetite or overeating 2   Feeling bad about yourself - or that you are a failure or have let yourself or your family down 0   Trouble concentrating on things, such as reading the newspaper or watching television 0   Moving or speaking so slowly that other people could have noticed. Or the opposite - being so fidgety or restless that you have been moving around a lot more than usual 0   Thoughts that you would be better off dead, or of hurting yourself in some way 0   Total Score 12   If you checked off any problems, how difficult have these problems made it for you to do your work, take care of things at home, or get along with other people? Somewhat difficult         Lab on 12/13/2017   Component Date Value Ref Range Status   • Glucose 12/13/2017 103* 70 - 100 mg/dL Final   • BUN 12/13/2017 23  8 - 25  mg/dL Final   • Creatinine 12/13/2017 1.00  0.40 - 1.30 mg/dL Final   • Sodium 12/13/2017 140  134 - 146 mmol/L Final   • Potassium 12/13/2017 3.8  3.4 - 5.4 mmol/L Final   • Chloride 12/13/2017 105  100 - 112 mmol/L Final   • CO2 12/13/2017 26.0  20.0 - 32.0 mmol/L Final   • Calcium 12/13/2017 9.2  8.4 - 10.8 mg/dL Final   • Total Protein 12/13/2017 7.5  6.7 - 8.2 g/dL Final   • Albumin 12/13/2017 4.20  3.20 - 5.50 g/dL Final   • ALT (SGPT) 12/13/2017 20  10 - 60 U/L Final   • AST (SGOT) 12/13/2017 24  10 - 60 U/L Final   • Alkaline Phosphatase 12/13/2017 114  15 - 121 U/L Final   • Total Bilirubin 12/13/2017 1.0  0.2 - 1.0 mg/dL Final   • eGFR Non African Amer 12/13/2017 76  56 - 130 mL/min/1.73 Final   • Globulin 12/13/2017 3.3  2.5 - 4.6 gm/dL Final   • A/G Ratio 12/13/2017 1.3  1.0 - 3.0 g/dL Final   • BUN/Creatinine Ratio 12/13/2017 23.0  7.0 - 25.0 Final   • Anion Gap 12/13/2017 9.0  5.0 - 15.0 mmol/L Final   • Hemoglobin A1C 12/13/2017 5.6  4 - 5.6 % Final   • LDL Cholesterol  12/13/2017 86  0 - 129 mg/dL Final   • TSH 12/13/2017 2.400  0.460 - 4.680 mIU/mL Final   • Free T4 12/13/2017 1.59  0.78 - 2.19 ng/dL Final   Admission on 09/21/2017, Discharged on 09/21/2017   Component Date Value Ref Range Status   • WBC 09/21/2017 7.86  3.20 - 9.80 10*3/mm3 Final   • RBC 09/21/2017 4.79  4.37 - 5.74 10*6/mm3 Final   • Hemoglobin 09/21/2017 14.3  13.7 - 17.3 g/dL Final   • Hematocrit 09/21/2017 42.4  39.0 - 49.0 % Final   • MCV 09/21/2017 88.5  80.0 - 98.0 fL Final   • MCH 09/21/2017 29.9  26.5 - 34.0 pg Final   • MCHC 09/21/2017 33.7  31.5 - 36.3 g/dL Final   • RDW 09/21/2017 13.1  11.5 - 14.5 % Final   • RDW-SD 09/21/2017 42.5  35.1 - 43.9 fl Final   • MPV 09/21/2017 10.0  8.0 - 12.0 fL Final   • Platelets 09/21/2017 258  150 - 450 10*3/mm3 Final   • Glucose 09/21/2017 88  60 - 100 mg/dL Final   • BUN 09/21/2017 20  7 - 21 mg/dL Final   • Creatinine 09/21/2017 0.98  0.70 - 1.30 mg/dL Final   • Sodium 09/21/2017  141  137 - 145 mmol/L Final   • Potassium 09/21/2017 3.8  3.5 - 5.1 mmol/L Final   • Chloride 09/21/2017 106  95 - 110 mmol/L Final   • CO2 09/21/2017 25.0  22.0 - 31.0 mmol/L Final   • Calcium 09/21/2017 8.9  8.4 - 10.2 mg/dL Final   • Total Protein 09/21/2017 6.7  6.3 - 8.6 g/dL Final   • Albumin 09/21/2017 4.10  3.40 - 4.80 g/dL Final   • ALT (SGPT) 09/21/2017 39  21 - 72 U/L Final   • AST (SGOT) 09/21/2017 24  17 - 59 U/L Final   • Alkaline Phosphatase 09/21/2017 116  38 - 126 U/L Final   • Total Bilirubin 09/21/2017 0.7  0.2 - 1.3 mg/dL Final   • eGFR Non African Amer 09/21/2017 78  56 - 130 mL/min/1.73 Final   • Globulin 09/21/2017 2.6  2.3 - 3.5 gm/dL Final   • A/G Ratio 09/21/2017 1.6  1.1 - 1.8 g/dL Final   • BUN/Creatinine Ratio 09/21/2017 20.4  7.0 - 25.0 Final   • Anion Gap 09/21/2017 10.0  5.0 - 15.0 mmol/L Final   • Protime 09/21/2017 12.6  11.1 - 15.3 Seconds Final   • INR 09/21/2017 0.95  0.80 - 1.20 Final   Lab on 09/05/2017   Component Date Value Ref Range Status   • TSH 09/05/2017 2.100  0.460 - 4.680 mIU/mL Final   • Free T4 09/05/2017 1.43  0.78 - 2.19 ng/dL Final   ]

## 2018-03-27 RX ORDER — LEVOTHYROXINE SODIUM 0.07 MG/1
75 TABLET ORAL DAILY
Qty: 30 TABLET | Refills: 3 | Status: SHIPPED | OUTPATIENT
Start: 2018-03-27 | End: 2018-08-10 | Stop reason: SDUPTHER

## 2018-06-28 ENCOUNTER — LAB (OUTPATIENT)
Dept: LAB | Facility: OTHER | Age: 58
End: 2018-06-28

## 2018-06-28 DIAGNOSIS — E78.01 FAMILIAL HYPERCHOLESTEROLEMIA: ICD-10-CM

## 2018-06-28 DIAGNOSIS — R73.9 HYPERGLYCEMIA: ICD-10-CM

## 2018-06-28 LAB
ALBUMIN SERPL-MCNC: 4.6 G/DL (ref 3.5–5)
ALBUMIN/GLOB SERPL: 1.6 G/DL (ref 1.1–1.8)
ALP SERPL-CCNC: 144 U/L (ref 38–126)
ALT SERPL W P-5'-P-CCNC: 25 U/L
ANION GAP SERPL CALCULATED.3IONS-SCNC: 12 MMOL/L (ref 5–15)
AST SERPL-CCNC: 29 U/L (ref 17–59)
BILIRUB SERPL-MCNC: 0.4 MG/DL (ref 0.2–1.3)
BUN BLD-MCNC: 18 MG/DL (ref 9–20)
BUN/CREAT SERPL: 18.4 (ref 7–25)
CALCIUM SPEC-SCNC: 9.3 MG/DL (ref 8.4–10.2)
CHLORIDE SERPL-SCNC: 104 MMOL/L (ref 98–107)
CO2 SERPL-SCNC: 26 MMOL/L (ref 22–30)
CREAT BLD-MCNC: 0.98 MG/DL (ref 0.66–1.25)
GFR SERPL CREATININE-BSD FRML MDRD: 78 ML/MIN/1.73 (ref 49–113)
GLOBULIN UR ELPH-MCNC: 2.9 GM/DL (ref 2.3–3.5)
GLUCOSE BLD-MCNC: 93 MG/DL (ref 74–99)
HBA1C MFR BLD: 5.8 % (ref 4–5.6)
POTASSIUM BLD-SCNC: 4.3 MMOL/L (ref 3.4–5)
PROT SERPL-MCNC: 7.5 G/DL (ref 6.3–8.2)
SODIUM BLD-SCNC: 142 MMOL/L (ref 137–145)

## 2018-06-28 PROCEDURE — 83036 HEMOGLOBIN GLYCOSYLATED A1C: CPT | Performed by: FAMILY MEDICINE

## 2018-06-28 PROCEDURE — 83721 ASSAY OF BLOOD LIPOPROTEIN: CPT | Performed by: FAMILY MEDICINE

## 2018-06-28 PROCEDURE — 80053 COMPREHEN METABOLIC PANEL: CPT | Performed by: FAMILY MEDICINE

## 2018-06-28 PROCEDURE — 36415 COLL VENOUS BLD VENIPUNCTURE: CPT | Performed by: FAMILY MEDICINE

## 2018-06-29 LAB — ARTICHOKE IGE QN: 84 MG/DL (ref 1–129)

## 2018-07-10 ENCOUNTER — OFFICE VISIT (OUTPATIENT)
Dept: FAMILY MEDICINE CLINIC | Facility: CLINIC | Age: 58
End: 2018-07-10

## 2018-07-10 VITALS
SYSTOLIC BLOOD PRESSURE: 122 MMHG | HEIGHT: 67 IN | BODY MASS INDEX: 32.96 KG/M2 | HEART RATE: 75 BPM | WEIGHT: 210 LBS | DIASTOLIC BLOOD PRESSURE: 80 MMHG | OXYGEN SATURATION: 98 % | TEMPERATURE: 98.9 F

## 2018-07-10 DIAGNOSIS — J45.30 MILD PERSISTENT ASTHMA WITHOUT COMPLICATION: ICD-10-CM

## 2018-07-10 DIAGNOSIS — E03.9 ACQUIRED HYPOTHYROIDISM: ICD-10-CM

## 2018-07-10 DIAGNOSIS — E78.01 FAMILIAL HYPERCHOLESTEROLEMIA: Primary | ICD-10-CM

## 2018-07-10 DIAGNOSIS — I25.10 CORONARY ARTERY DISEASE INVOLVING NATIVE CORONARY ARTERY OF NATIVE HEART WITHOUT ANGINA PECTORIS: ICD-10-CM

## 2018-07-10 DIAGNOSIS — S43.431D SUPERIOR GLENOID LABRUM LESION OF RIGHT SHOULDER, SUBSEQUENT ENCOUNTER: ICD-10-CM

## 2018-07-10 DIAGNOSIS — R73.9 HYPERGLYCEMIA: ICD-10-CM

## 2018-07-10 PROCEDURE — 99214 OFFICE O/P EST MOD 30 MIN: CPT | Performed by: FAMILY MEDICINE

## 2018-07-10 RX ORDER — PRAVASTATIN SODIUM 40 MG
40 TABLET ORAL DAILY
Qty: 30 TABLET | Refills: 6 | Status: SHIPPED | OUTPATIENT
Start: 2018-07-10 | End: 2018-09-26

## 2018-07-10 NOTE — PROGRESS NOTES
Subjective   Prudencio Rodriguez is a 58 y.o. male who presents to the office for follow-up and review of labs.     History of Present Illness   Patient with multiple medical problems including hyperlipidemia, CAD, and asthma is in today for reevaluation and to review labs.  Patient underwent recent right shoulder repair and is doing well with rehabilitation.  He denies chest pain, PND, orthopnea.  He denies neurological symptoms.    The following portions of the patient's history were reviewed and updated as appropriate: allergies, current medications, past family history, past medical history, past social history, past surgical history and problem list.    Review of Systems   Constitutional: Negative.    HENT: Negative.    Eyes: Negative.    Respiratory: Negative.    Cardiovascular: Negative.    Gastrointestinal: Negative.    Endocrine: Negative.    Genitourinary: Negative.    Musculoskeletal: Positive for arthralgias.   Skin: Negative.    Allergic/Immunologic: Negative.    Neurological: Negative.    Hematological: Negative.    Psychiatric/Behavioral: Negative.    All other systems reviewed and are negative.      Objective   Physical Exam   Constitutional: He is oriented to person, place, and time. He appears well-developed and well-nourished.   HENT:   Head: Normocephalic and atraumatic.   Right Ear: External ear normal.   Left Ear: External ear normal.   Nose: Nose normal.   Mouth/Throat: Oropharynx is clear and moist.   Eyes: Conjunctivae and EOM are normal. Pupils are equal, round, and reactive to light.   Neck: Normal range of motion. Neck supple.   Cardiovascular: Normal rate, regular rhythm, normal heart sounds and intact distal pulses.  Exam reveals no gallop and no friction rub.    No murmur heard.  Pulmonary/Chest: Effort normal and breath sounds normal. He has no wheezes. He has no rales.   Abdominal: Soft. Bowel sounds are normal. He exhibits no mass. There is no tenderness. There is no rebound and no  guarding.   Musculoskeletal:        Right shoulder: He exhibits decreased range of motion and laceration.   Neurological: He is alert and oriented to person, place, and time. He has normal reflexes. No cranial nerve deficit. He exhibits normal muscle tone.   Skin: Skin is warm and dry. No rash noted.   Psychiatric: He has a normal mood and affect. His behavior is normal. Judgment and thought content normal.   Nursing note and vitals reviewed.      Assessment/Plan   Prudencio was seen today for results.    Diagnoses and all orders for this visit:    Familial hypercholesterolemia  -     CBC & Differential; Future  -     Comprehensive Metabolic Panel; Future  -     Lipid Panel; Future  -     PSA Screen; Future    Coronary artery disease involving native coronary artery of native heart without angina pectoris    Mild persistent asthma without complication    Acquired hypothyroidism  -     TSH; Future    Superior glenoid labrum lesion of right shoulder, subsequent encounter    Hyperglycemia  -     Hemoglobin A1c; Future    Other orders  -     pravastatin (PRAVACHOL) 40 MG tablet; Take 1 tablet by mouth Daily.    Continue with other therapies.     Labs are reviewed with patient.    PHQ-2/PHQ-9 Depression Screening 7/12/2017   Little interest or pleasure in doing things 2   Feeling down, depressed, or hopeless 2   Trouble falling or staying asleep, or sleeping too much 3   Feeling tired or having little energy 3   Poor appetite or overeating 2   Feeling bad about yourself - or that you are a failure or have let yourself or your family down 0   Trouble concentrating on things, such as reading the newspaper or watching television 0   Moving or speaking so slowly that other people could have noticed. Or the opposite - being so fidgety or restless that you have been moving around a lot more than usual 0   Thoughts that you would be better off dead, or of hurting yourself in some way 0   Total Score 12   If you checked off any  problems, how difficult have these problems made it for you to do your work, take care of things at home, or get along with other people? Somewhat difficult         Lab on 06/28/2018   Component Date Value Ref Range Status   • Glucose 06/28/2018 93  74 - 99 mg/dL Final   • BUN 06/28/2018 18  9 - 20 mg/dL Final   • Creatinine 06/28/2018 0.98  0.66 - 1.25 mg/dL Final   • Sodium 06/28/2018 142  137 - 145 mmol/L Final   • Potassium 06/28/2018 4.3  3.4 - 5.0 mmol/L Final   • Chloride 06/28/2018 104  98 - 107 mmol/L Final   • CO2 06/28/2018 26.0  22.0 - 30.0 mmol/L Final   • Calcium 06/28/2018 9.3  8.4 - 10.2 mg/dL Final   • Total Protein 06/28/2018 7.5  6.3 - 8.2 g/dL Final   • Albumin 06/28/2018 4.60  3.50 - 5.00 g/dL Final   • ALT (SGPT) 06/28/2018 25  <=50 U/L Final   • AST (SGOT) 06/28/2018 29  17 - 59 U/L Final   • Alkaline Phosphatase 06/28/2018 144* 38 - 126 U/L Final   • Total Bilirubin 06/28/2018 0.4  0.2 - 1.3 mg/dL Final   • eGFR Non African Amer 06/28/2018 78  49 - 113 mL/min/1.73 Final   • Globulin 06/28/2018 2.9  2.3 - 3.5 gm/dL Final   • A/G Ratio 06/28/2018 1.6  1.1 - 1.8 g/dL Final   • BUN/Creatinine Ratio 06/28/2018 18.4  7.0 - 25.0 Final   • Anion Gap 06/28/2018 12.0  5.0 - 15.0 mmol/L Final   • Hemoglobin A1C 06/28/2018 5.8* 4 - 5.6 % Final   • LDL Cholesterol  06/28/2018 84  1 - 129 mg/dL Final   Admission on 02/15/2018, Discharged on 02/15/2018   Component Date Value Ref Range Status   • Rapid Influenza A Ag 02/15/2018 NEG   Final   • Rapid Influenza B Ag 02/15/2018 NEG   Final   • Internal Control 02/15/2018 Passed  Passed Final   • Lot Number 02/15/2018 [id number]   Final   • Expiration Date 02/15/2018 3/20   Final   ]

## 2018-08-13 RX ORDER — LEVOTHYROXINE SODIUM 0.07 MG/1
75 TABLET ORAL DAILY
Qty: 30 TABLET | Refills: 3 | Status: SHIPPED | OUTPATIENT
Start: 2018-08-13 | End: 2018-09-26

## 2018-09-26 ENCOUNTER — CLINICAL SUPPORT (OUTPATIENT)
Dept: FAMILY MEDICINE CLINIC | Facility: CLINIC | Age: 58
End: 2018-09-26

## 2018-09-26 VITALS
BODY MASS INDEX: 34.5 KG/M2 | HEIGHT: 67 IN | SYSTOLIC BLOOD PRESSURE: 120 MMHG | WEIGHT: 219.8 LBS | DIASTOLIC BLOOD PRESSURE: 70 MMHG | HEART RATE: 77 BPM

## 2018-09-26 DIAGNOSIS — Z02.89 ENCOUNTER FOR EXAMINATION REQUIRED BY DEPARTMENT OF TRANSPORTATION (DOT): Primary | ICD-10-CM

## 2018-09-26 PROCEDURE — DOTPHY: Performed by: NURSE PRACTITIONER

## 2018-09-26 RX ORDER — ROSUVASTATIN CALCIUM 10 MG/1
10 TABLET, COATED ORAL DAILY
COMMUNITY

## 2018-09-26 NOTE — PROGRESS NOTES
Prudencio Rodriguez is a 58 y.o. 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

## 2018-10-08 ENCOUNTER — OFFICE VISIT (OUTPATIENT)
Dept: FAMILY MEDICINE CLINIC | Facility: CLINIC | Age: 58
End: 2018-10-08

## 2018-10-08 VITALS
OXYGEN SATURATION: 95 % | BODY MASS INDEX: 34.15 KG/M2 | DIASTOLIC BLOOD PRESSURE: 90 MMHG | WEIGHT: 217.6 LBS | SYSTOLIC BLOOD PRESSURE: 134 MMHG | TEMPERATURE: 98.3 F | HEIGHT: 67 IN | HEART RATE: 78 BPM

## 2018-10-08 DIAGNOSIS — J02.9 ACUTE PHARYNGITIS, UNSPECIFIED ETIOLOGY: Primary | ICD-10-CM

## 2018-10-08 DIAGNOSIS — B37.0 THRUSH, ORAL: ICD-10-CM

## 2018-10-08 DIAGNOSIS — B37.0 THRUSH: ICD-10-CM

## 2018-10-08 PROCEDURE — 99213 OFFICE O/P EST LOW 20 MIN: CPT | Performed by: FAMILY MEDICINE

## 2018-10-08 RX ORDER — FLUCONAZOLE 200 MG/1
200 TABLET ORAL DAILY
Qty: 7 TABLET | Refills: 0 | Status: SHIPPED | OUTPATIENT
Start: 2018-10-08 | End: 2019-08-14

## 2018-10-08 RX ORDER — DIPHENHYDRAMINE, LIDOCAINE, NYSTATIN
5 KIT ORAL 4 TIMES DAILY
Qty: 180 ML | Refills: 0 | Status: SHIPPED | OUTPATIENT
Start: 2018-10-08 | End: 2018-10-09 | Stop reason: ALTCHOICE

## 2018-10-08 RX ORDER — PENICILLIN V POTASSIUM 500 MG/1
500 TABLET ORAL 4 TIMES DAILY
Qty: 28 TABLET | Refills: 0 | Status: SHIPPED | OUTPATIENT
Start: 2018-10-08 | End: 2018-11-21

## 2018-10-08 NOTE — PROGRESS NOTES
Subjective   Prudencio Rodriguez is a 58 y.o. male.   Chief Complaint   Patient presents with   • Thrush     Mouth saw Urgent Care Friday       Oral Pain    This is a new problem. The current episode started in the past 7 days. The problem occurs constantly. The problem has been unchanged. The pain is at a severity of 8/10. The pain is severe. Associated symptoms include difficulty swallowing and thermal sensitivity. Pertinent negatives include no fever. He has tried NSAIDs for the symptoms. The treatment provided moderate relief.      Urgent care notes  The following portions of the patient's history were reviewed and updated as appropriate: allergies, current medications, past family history, past medical history, past social history, past surgical history and problem list.    Review of Systems   Constitutional: Negative.  Negative for fever.   HENT: Positive for mouth sores and trouble swallowing.    Eyes: Negative.    Respiratory: Negative.    Cardiovascular: Negative.    Gastrointestinal: Negative.    Endocrine: Negative.    Genitourinary: Negative.    Musculoskeletal: Positive for arthralgias.   Skin: Negative.    Allergic/Immunologic: Negative.    Neurological: Negative.    Hematological: Negative.    Psychiatric/Behavioral: Negative.    All other systems reviewed and are negative.      Objective   Physical Exam   Constitutional: He is oriented to person, place, and time. He appears well-developed and well-nourished.   HENT:   Head: Normocephalic and atraumatic.   Right Ear: External ear normal.   Left Ear: External ear normal.   Nose: Nose normal.   Mouth/Throat: Oropharyngeal exudate and posterior oropharyngeal erythema present.       Eyes: Pupils are equal, round, and reactive to light. Conjunctivae and EOM are normal.   Neck: Normal range of motion. Neck supple.   Cardiovascular: Normal rate, regular rhythm, normal heart sounds and intact distal pulses.  Exam reveals no gallop and no friction rub.    No  murmur heard.  Pulmonary/Chest: Effort normal and breath sounds normal. He has no wheezes. He has no rales.   Abdominal: Soft. Bowel sounds are normal. He exhibits no mass. There is no tenderness. There is no rebound and no guarding.   Musculoskeletal: Normal range of motion.   Neurological: He is alert and oriented to person, place, and time. He has normal reflexes. No cranial nerve deficit. He exhibits normal muscle tone.   Skin: Skin is warm and dry. No rash noted.   Psychiatric: He has a normal mood and affect. His behavior is normal. Judgment and thought content normal.   Nursing note and vitals reviewed.      Assessment/Plan   Prudencio was seen today for thrush.    Diagnoses and all orders for this visit:    Acute pharyngitis, unspecified etiology    Thrush    Thrush, oral  -     nystatin susp + lidocaine viscous (MAGIC MOUTHWASH) oral suspension; Swish and swallow 5 mL 4 (Four) Times a Day.    Other orders  -     penicillin v potassium (VEETID) 500 MG tablet; Take 1 tablet by mouth 4 (Four) Times a Day.  -     fluconazole (DIFLUCAN) 200 MG tablet; Take 1 tablet by mouth Daily.

## 2018-10-09 DIAGNOSIS — J02.9 ACUTE PHARYNGITIS, UNSPECIFIED ETIOLOGY: Primary | ICD-10-CM

## 2018-10-09 DIAGNOSIS — B37.0 THRUSH: ICD-10-CM

## 2018-11-16 RX ORDER — LEVOTHYROXINE SODIUM 0.07 MG/1
75 TABLET ORAL DAILY
Qty: 30 TABLET | Refills: 0 | Status: SHIPPED | OUTPATIENT
Start: 2018-11-16 | End: 2019-08-14

## 2018-11-24 ENCOUNTER — TELEPHONE (OUTPATIENT)
Dept: URGENT CARE | Facility: CLINIC | Age: 58
End: 2018-11-24

## 2018-12-13 RX ORDER — ALBUTEROL SULFATE 90 UG/1
2 AEROSOL, METERED RESPIRATORY (INHALATION) EVERY 4 HOURS PRN
Qty: 18 G | Refills: 5 | OUTPATIENT
Start: 2018-12-13

## 2018-12-13 NOTE — TELEPHONE ENCOUNTER
Patient needs to est care with new provider.  Proair inhaler last sent on 10/17/17. No record of Dulera being sent.

## 2019-01-17 RX ORDER — LEVOTHYROXINE SODIUM 0.07 MG/1
75 TABLET ORAL DAILY
Qty: 30 TABLET | Refills: 0 | OUTPATIENT
Start: 2019-01-17

## 2019-08-14 ENCOUNTER — CLINICAL SUPPORT (OUTPATIENT)
Dept: FAMILY MEDICINE CLINIC | Facility: CLINIC | Age: 59
End: 2019-08-14

## 2019-08-14 VITALS
DIASTOLIC BLOOD PRESSURE: 68 MMHG | WEIGHT: 188 LBS | HEIGHT: 66 IN | SYSTOLIC BLOOD PRESSURE: 112 MMHG | HEART RATE: 67 BPM | BODY MASS INDEX: 30.22 KG/M2

## 2019-08-14 DIAGNOSIS — Z02.89 ENCOUNTER FOR EXAMINATION REQUIRED BY DEPARTMENT OF TRANSPORTATION (DOT): Primary | ICD-10-CM

## 2019-08-14 PROCEDURE — DOTPHY: Performed by: NURSE PRACTITIONER

## 2019-08-14 NOTE — PATIENT INSTRUCTIONS

## 2019-08-14 NOTE — PROGRESS NOTES
Prudencio Rodriguez is a 59 y.o. 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

## 2020-07-09 ENCOUNTER — CLINICAL SUPPORT (OUTPATIENT)
Dept: FAMILY MEDICINE CLINIC | Facility: CLINIC | Age: 60
End: 2020-07-09

## 2020-07-09 DIAGNOSIS — Z02.89 ENCOUNTER FOR EXAMINATION REQUIRED BY DEPARTMENT OF TRANSPORTATION (DOT): Primary | ICD-10-CM

## 2020-07-09 PROCEDURE — DOTPHY: Performed by: INTERNAL MEDICINE

## 2020-07-09 NOTE — PROGRESS NOTES
Prudencio Rodriguez is a 60 y.o. 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  
No

## 2021-06-18 ENCOUNTER — CLINICAL SUPPORT (OUTPATIENT)
Dept: FAMILY MEDICINE CLINIC | Facility: CLINIC | Age: 61
End: 2021-06-18

## 2021-06-18 VITALS
WEIGHT: 208 LBS | HEIGHT: 67 IN | SYSTOLIC BLOOD PRESSURE: 118 MMHG | BODY MASS INDEX: 32.65 KG/M2 | HEART RATE: 80 BPM | DIASTOLIC BLOOD PRESSURE: 76 MMHG

## 2021-06-18 DIAGNOSIS — Z02.89 ENCOUNTER FOR EXAMINATION REQUIRED BY DEPARTMENT OF TRANSPORTATION (DOT): Primary | ICD-10-CM

## 2021-06-18 PROCEDURE — DOTPHY: Performed by: INTERNAL MEDICINE

## 2021-06-18 RX ORDER — BEMPEDOIC ACID AND EZETIMIBE 180; 10 MG/1; MG/1
TABLET, FILM COATED ORAL
COMMUNITY
Start: 2021-04-12

## 2021-06-18 NOTE — PROGRESS NOTES
Prudencio Rodriguez is a 61 y.o. 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

## 2022-06-03 PROBLEM — M65.331 TRIGGER MIDDLE FINGER OF RIGHT HAND: Status: ACTIVE | Noted: 2022-05-04

## 2022-06-10 ENCOUNTER — CLINICAL SUPPORT (OUTPATIENT)
Dept: FAMILY MEDICINE CLINIC | Facility: CLINIC | Age: 62
End: 2022-06-10

## 2022-06-10 VITALS
HEART RATE: 74 BPM | BODY MASS INDEX: 33.06 KG/M2 | HEIGHT: 67 IN | DIASTOLIC BLOOD PRESSURE: 82 MMHG | WEIGHT: 210.6 LBS | TEMPERATURE: 98.4 F | OXYGEN SATURATION: 95 % | SYSTOLIC BLOOD PRESSURE: 120 MMHG

## 2022-06-10 DIAGNOSIS — Z02.89 ENCOUNTER FOR EXAMINATION REQUIRED BY DEPARTMENT OF TRANSPORTATION (DOT): Primary | ICD-10-CM

## 2022-06-10 PROCEDURE — DOTPHY: Performed by: INTERNAL MEDICINE

## 2022-06-10 RX ORDER — NITROGLYCERIN 0.4 MG/1
TABLET SUBLINGUAL
COMMUNITY

## 2022-06-10 RX ORDER — UBIDECARENONE 50 MG
100 CAPSULE ORAL DAILY
COMMUNITY

## 2022-06-10 NOTE — PROGRESS NOTES
Prudencio Rodriguez is a 62 y.o. 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

## 2023-05-31 ENCOUNTER — CLINICAL SUPPORT (OUTPATIENT)
Dept: FAMILY MEDICINE CLINIC | Facility: CLINIC | Age: 63
End: 2023-05-31

## 2023-05-31 VITALS — WEIGHT: 206 LBS | OXYGEN SATURATION: 96 % | HEIGHT: 67 IN | BODY MASS INDEX: 32.33 KG/M2 | HEART RATE: 83 BPM

## 2023-05-31 DIAGNOSIS — Z02.89 ENCOUNTER FOR EXAMINATION REQUIRED BY DEPARTMENT OF TRANSPORTATION (DOT): Primary | ICD-10-CM

## 2023-05-31 NOTE — PROGRESS NOTES
Prudencio Rodriguez is a 63 y.o. 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

## 2023-06-13 ENCOUNTER — LAB (OUTPATIENT)
Dept: LAB | Facility: OTHER | Age: 63
End: 2023-06-13

## 2023-06-23 ENCOUNTER — PATIENT ROUNDING (BHMG ONLY) (OUTPATIENT)
Dept: FAMILY MEDICINE CLINIC | Facility: CLINIC | Age: 63
End: 2023-06-23

## 2023-06-26 NOTE — PROGRESS NOTES
"June 23, 2023    Hello, may I speak with Prudencio Rodriguez?    My name is Jordana Clinical Coordinator      I am  with [facility]  [facility] [address] [place] [id number] [phone].  Spoke with patient at Corinna Feliciano's office after DOT physical.   Before we get started may I verify your date of birth? 1960    I am calling to officially welcome you to our practice and ask about your recent visit. Is this a good time to talk? yes    Tell me about your visit with us. What things went well?  \"I see antonio for DOT, it was great\".       We're always looking for ways to make our patients' experiences even better. Do you have recommendations on ways we may improve?  no    Overall were you satisfied with your first visit to our practice? yes       I appreciate you taking the time to speak with me today. Is there anything else I can do for you? no      Thank you, and have a great day.      "

## (undated) DEVICE — CATH DIAG EXPO .052 PIG 6F 110CM

## (undated) DEVICE — CATH DIAG EXPO .056 FR4 6F 100CM

## (undated) DEVICE — ELECTRODE,RT,STRESS,FOAM,50PK: Brand: MEDLINE

## (undated) DEVICE — NDL ANGIOGR ADV THN SMOTH SGLWALL 21G 1.5

## (undated) DEVICE — PK CATH LAB 60

## (undated) DEVICE — GW PERIPH GUIDERIGHT STD/EXCHNG/J/TIP SS 0.038IN 5X260CM

## (undated) DEVICE — MODEL AT P65, P/N 701554-001KIT CONTENTS: HAND CONTROLLER, 3-WAY HIGH-PRESSURE STOPCOCK WITH ROTATING END AND PREMIUM HIGH-PRESSURE TUBING: Brand: ANGIOTOUCH® KIT

## (undated) DEVICE — GLIDESHEATH BASIC HYDROPHILIC COATED INTRODUCER SHEATH: Brand: GLIDESHEATH

## (undated) DEVICE — MODEL BT2000 P/N 700287-012KIT CONTENTS: MANIFOLD WITH SALINE AND CONTRAST PORTS, SALINE TUBING WITH SPIKE AND HAND SYRINGE, TRANSDUCER: Brand: BT2000 AUTOMATED MANIFOLD KIT

## (undated) DEVICE — TR BAND RADIAL ARTERY COMPRESSION DEVICE: Brand: TR BAND

## (undated) DEVICE — CATH DIAG EXPO .056 FL3.5 6F 100CM

## (undated) DEVICE — SPLNT WRST RAD 3560

## (undated) DEVICE — KT INTRO MINISTICK MAX W/GW NITNL/TUNG ECHO 4F 21G 7CM